# Patient Record
Sex: FEMALE | Race: WHITE | NOT HISPANIC OR LATINO | Employment: OTHER | URBAN - NONMETROPOLITAN AREA
[De-identification: names, ages, dates, MRNs, and addresses within clinical notes are randomized per-mention and may not be internally consistent; named-entity substitution may affect disease eponyms.]

---

## 2023-03-22 DIAGNOSIS — E78.5 DYSLIPIDEMIA: Primary | ICD-10-CM

## 2023-03-23 PROBLEM — M10.9 GOUT: Status: ACTIVE | Noted: 2023-03-23

## 2023-03-23 PROBLEM — E04.9 ENLARGED THYROID: Status: ACTIVE | Noted: 2023-03-23

## 2023-03-23 PROBLEM — M25.50 POLYARTHRALGIA: Status: ACTIVE | Noted: 2023-03-23

## 2023-03-23 PROBLEM — M25.512 LEFT SHOULDER PAIN: Status: ACTIVE | Noted: 2023-03-23

## 2023-03-23 PROBLEM — M54.2 CHRONIC NECK PAIN: Status: ACTIVE | Noted: 2023-03-23

## 2023-03-23 PROBLEM — G89.29 CHRONIC NECK PAIN: Status: ACTIVE | Noted: 2023-03-23

## 2023-03-23 PROBLEM — H92.09 EARACHE: Status: ACTIVE | Noted: 2023-03-23

## 2023-03-23 PROBLEM — I10 HTN (HYPERTENSION): Status: ACTIVE | Noted: 2023-03-23

## 2023-03-23 PROBLEM — H91.90 HEARING LOSS: Status: ACTIVE | Noted: 2023-03-23

## 2023-03-23 PROBLEM — R42 DIZZINESS: Status: ACTIVE | Noted: 2023-03-23

## 2023-03-23 PROBLEM — J30.9 ALLERGIC RHINITIS: Status: ACTIVE | Noted: 2023-03-23

## 2023-03-23 PROBLEM — E78.5 DYSLIPIDEMIA: Status: ACTIVE | Noted: 2023-03-23

## 2023-03-23 PROBLEM — E11.9 DMII (DIABETES MELLITUS, TYPE 2) (MULTI): Status: ACTIVE | Noted: 2023-03-23

## 2023-03-23 PROBLEM — H61.22 IMPACTED CERUMEN OF LEFT EAR: Status: ACTIVE | Noted: 2023-03-23

## 2023-03-23 PROBLEM — M79.642 PAIN OF LEFT HAND: Status: ACTIVE | Noted: 2023-03-23

## 2023-03-23 PROBLEM — D72.829 LEUKOCYTOSIS: Status: ACTIVE | Noted: 2023-03-23

## 2023-03-23 PROBLEM — M25.539 WRIST PAIN: Status: ACTIVE | Noted: 2023-03-23

## 2023-03-23 PROBLEM — M27.9 DISORDER OF JAW: Status: ACTIVE | Noted: 2023-03-23

## 2023-03-23 PROBLEM — M25.532 LEFT WRIST PAIN: Status: ACTIVE | Noted: 2023-03-23

## 2023-03-23 PROBLEM — K21.9 GERD (GASTROESOPHAGEAL REFLUX DISEASE): Status: ACTIVE | Noted: 2023-03-23

## 2023-03-23 PROBLEM — E03.9 ADULT HYPOTHYROIDISM: Status: ACTIVE | Noted: 2023-03-23

## 2023-03-23 PROBLEM — M25.531 RIGHT WRIST PAIN: Status: ACTIVE | Noted: 2023-03-23

## 2023-03-23 PROBLEM — H61.20 CERUMEN IMPACTION: Status: ACTIVE | Noted: 2023-03-23

## 2023-03-23 PROBLEM — R53.82 CHRONIC FATIGUE: Status: ACTIVE | Noted: 2023-03-23

## 2023-03-23 PROBLEM — G47.00 INSOMNIA: Status: ACTIVE | Noted: 2023-03-23

## 2023-03-23 PROBLEM — E55.9 VITAMIN D DEFICIENCY: Status: ACTIVE | Noted: 2023-03-23

## 2023-03-23 PROBLEM — F41.9 ANXIETY DISORDER: Status: ACTIVE | Noted: 2023-03-23

## 2023-03-23 RX ORDER — SIMVASTATIN 20 MG/1
1 TABLET, FILM COATED ORAL DAILY
COMMUNITY
Start: 2012-01-19 | End: 2023-06-09 | Stop reason: SDUPTHER

## 2023-03-23 RX ORDER — POLYETHYLENE GLYCOL 400 AND PROPYLENE GLYCOL 4; 3 MG/ML; MG/ML
SOLUTION/ DROPS OPHTHALMIC
COMMUNITY
End: 2023-06-28 | Stop reason: WASHOUT

## 2023-03-23 RX ORDER — SIMVASTATIN 20 MG/1
TABLET, FILM COATED ORAL
Qty: 90 TABLET | Refills: 0 | Status: SHIPPED | OUTPATIENT
Start: 2023-03-23 | End: 2023-06-28 | Stop reason: WASHOUT

## 2023-03-23 RX ORDER — LEVOTHYROXINE SODIUM 25 UG/1
1 TABLET ORAL DAILY
COMMUNITY
Start: 2021-10-11 | End: 2023-04-27 | Stop reason: ALTCHOICE

## 2023-03-23 RX ORDER — OMEPRAZOLE 20 MG/1
20 CAPSULE, DELAYED RELEASE ORAL
COMMUNITY
Start: 2022-09-05 | End: 2023-04-24

## 2023-03-23 RX ORDER — LABETALOL 200 MG/1
1 TABLET, FILM COATED ORAL DAILY
COMMUNITY
Start: 2012-01-19 | End: 2023-04-12

## 2023-03-23 RX ORDER — OMEPRAZOLE 20 MG/1
1 TABLET, DELAYED RELEASE ORAL DAILY
COMMUNITY
End: 2023-06-28 | Stop reason: SDUPTHER

## 2023-03-23 RX ORDER — ASPIRIN 81 MG/1
TABLET ORAL
COMMUNITY
End: 2023-06-28 | Stop reason: SDUPTHER

## 2023-03-23 RX ORDER — METFORMIN HYDROCHLORIDE 500 MG/1
1 TABLET ORAL 2 TIMES DAILY
COMMUNITY
Start: 2021-10-11 | End: 2023-04-24

## 2023-03-23 RX ORDER — CITALOPRAM 10 MG/1
1 TABLET ORAL DAILY
COMMUNITY
Start: 2023-02-16 | End: 2023-06-28 | Stop reason: WASHOUT

## 2023-03-23 RX ORDER — ALPRAZOLAM 0.25 MG/1
0.25 TABLET ORAL 2 TIMES DAILY PRN
COMMUNITY
End: 2023-03-24 | Stop reason: SDUPTHER

## 2023-03-23 RX ORDER — LATANOPROST 50 UG/ML
SOLUTION/ DROPS OPHTHALMIC
COMMUNITY
Start: 2021-05-24

## 2023-03-23 RX ORDER — ALBUTEROL SULFATE 0.83 MG/ML
SOLUTION RESPIRATORY (INHALATION)
COMMUNITY
Start: 2023-01-16 | End: 2023-09-26 | Stop reason: SDUPTHER

## 2023-03-23 RX ORDER — TRAMADOL HYDROCHLORIDE 50 MG/1
1 TABLET ORAL EVERY 8 HOURS PRN
COMMUNITY
Start: 2021-12-08 | End: 2023-06-28 | Stop reason: WASHOUT

## 2023-03-23 RX ORDER — ALLOPURINOL 300 MG/1
1 TABLET ORAL DAILY
COMMUNITY
Start: 2021-10-11 | End: 2023-06-28 | Stop reason: WASHOUT

## 2023-03-24 DIAGNOSIS — E78.5 DYSLIPIDEMIA: Primary | ICD-10-CM

## 2023-03-24 DIAGNOSIS — F41.9 ANXIETY: Primary | ICD-10-CM

## 2023-03-24 RX ORDER — ALPRAZOLAM 0.25 MG/1
0.25 TABLET ORAL 2 TIMES DAILY PRN
Qty: 60 TABLET | Refills: 0 | Status: SHIPPED | OUTPATIENT
Start: 2023-03-24 | End: 2023-04-27 | Stop reason: SDUPTHER

## 2023-03-24 RX ORDER — SIMVASTATIN 20 MG/1
TABLET, FILM COATED ORAL
Qty: 90 TABLET | Refills: 0 | Status: SHIPPED | OUTPATIENT
Start: 2023-03-24 | End: 2023-06-28 | Stop reason: WASHOUT

## 2023-03-28 ENCOUNTER — APPOINTMENT (OUTPATIENT)
Dept: PRIMARY CARE | Facility: CLINIC | Age: 83
End: 2023-03-28
Payer: MEDICARE

## 2023-03-30 ENCOUNTER — OFFICE VISIT (OUTPATIENT)
Dept: PRIMARY CARE | Facility: CLINIC | Age: 83
End: 2023-03-30
Payer: MEDICARE

## 2023-03-30 VITALS
WEIGHT: 161 LBS | HEART RATE: 64 BPM | DIASTOLIC BLOOD PRESSURE: 62 MMHG | HEIGHT: 62 IN | OXYGEN SATURATION: 98 % | SYSTOLIC BLOOD PRESSURE: 168 MMHG | BODY MASS INDEX: 29.63 KG/M2

## 2023-03-30 DIAGNOSIS — E78.5 DYSLIPIDEMIA: ICD-10-CM

## 2023-03-30 DIAGNOSIS — Z78.0 POSTMENOPAUSAL: ICD-10-CM

## 2023-03-30 DIAGNOSIS — I10 PRIMARY HYPERTENSION: Primary | ICD-10-CM

## 2023-03-30 DIAGNOSIS — E55.9 VITAMIN D DEFICIENCY: ICD-10-CM

## 2023-03-30 DIAGNOSIS — M1A.09X0 IDIOPATHIC CHRONIC GOUT OF MULTIPLE SITES WITHOUT TOPHUS: ICD-10-CM

## 2023-03-30 DIAGNOSIS — F41.1 GENERALIZED ANXIETY DISORDER: ICD-10-CM

## 2023-03-30 DIAGNOSIS — M25.50 POLYARTHRALGIA: ICD-10-CM

## 2023-03-30 DIAGNOSIS — E03.9 ADULT HYPOTHYROIDISM: ICD-10-CM

## 2023-03-30 DIAGNOSIS — R53.82 CHRONIC FATIGUE: ICD-10-CM

## 2023-03-30 DIAGNOSIS — E66.3 OVER WEIGHT: ICD-10-CM

## 2023-03-30 DIAGNOSIS — Z12.31 ENCOUNTER FOR SCREENING MAMMOGRAM FOR MALIGNANT NEOPLASM OF BREAST: ICD-10-CM

## 2023-03-30 DIAGNOSIS — E11.9 TYPE 2 DIABETES MELLITUS WITHOUT COMPLICATION, WITHOUT LONG-TERM CURRENT USE OF INSULIN (MULTI): ICD-10-CM

## 2023-03-30 PROCEDURE — 3078F DIAST BP <80 MM HG: CPT | Performed by: FAMILY MEDICINE

## 2023-03-30 PROCEDURE — 1159F MED LIST DOCD IN RCRD: CPT | Performed by: FAMILY MEDICINE

## 2023-03-30 PROCEDURE — 1036F TOBACCO NON-USER: CPT | Performed by: FAMILY MEDICINE

## 2023-03-30 PROCEDURE — 1160F RVW MEDS BY RX/DR IN RCRD: CPT | Performed by: FAMILY MEDICINE

## 2023-03-30 PROCEDURE — 99214 OFFICE O/P EST MOD 30 MIN: CPT | Performed by: FAMILY MEDICINE

## 2023-03-30 PROCEDURE — 3077F SYST BP >= 140 MM HG: CPT | Performed by: FAMILY MEDICINE

## 2023-03-30 ASSESSMENT — PAIN SCALES - GENERAL: PAINLEVEL: 0-NO PAIN

## 2023-03-30 NOTE — PROGRESS NOTES
"Subjective     Linsey Donis is a 82 y.o. female who presents for Follow-up (Follow up meds).      HPI  The patient is a 82 year-old female presenting to the clinic for follow up on ankle and foot pain.  Follow-up.  Recently her blood pressure was running high.  I have adjusted her medication doses and has been watching diet has been exercising has been taking medication.  Blood pressure still running high.  I have reviewed AVA-7 with the patient.  History of anxiety.  Denies depression.  Denies suicidal.  Denies homicidal.  Educated on gout care and prevention treatment and management.  Educated on hypothyroidism.  Educated on joint pains and muscle aches.  Complaining feeling tired.  Advised on diet exercise.  Educated on dyslipidemia and diet and exercise.  Educated on vitamin D deficiency.  Educated on type 2 diabetes and diet exercise.    Educated on postmenopausal care and symptoms and management.  Educated on overweight and diet exercise.  Will lose weight.            Review of Systems  Review of systems    General.  Denies fever.  Denies chills.    HEENT denies nasal congestion.  Denies sinus pressure.    Respiratory.  Denies cough.  Denies shortness of breath.    Cardiovascular.  Dictated as above.      Gastrointestinal.  Denies nausea vomiting diarrhea.  Denies abdominal pain.    Genitourinary denies burning urination.  Denies frequent urination.  Denies flank pain.  Denies blood in the urine.  Denies abnormal vaginal discharge.    Neurology.  Denies tingling numbness but denies weakness.  Denies headache.  Denies blurred vision.    Musculoskeletal.  Dictated as above.      Endocrinology. Dictated as above.      Psychiatric.  Denies depression.  Denies anxiety.  Denies suicidal.  Denies homicidal.        Objective   /62 (BP Location: Left arm, Patient Position: Sitting, BP Cuff Size: Large adult)   Pulse 64   Ht 1.575 m (5' 2\")   Wt 73 kg (161 lb)   SpO2 98%   BMI 29.45 kg/m²   V   Physical " Exam  General.  Not in distress.  HEENT normocephalic anicteric sclerae.  Neck soft supple no thyromegaly.  No carotid bruit.  Lungs are clear.  Heart regular.  Abdomen soft nontender nondistended bowel sounds are positive.  Extremities no clubbing cyanosis or edema.  Psychiatric.  Has good eye contact.  No crying spells noted.  Speech was normal.  Denies depression.  Denies suicidal.  Denies homicidal.    This was completed via telephone due to the restrictions of the COVID-19 pandemic.  All issues as below were discussed and addressed but no physical exam was performed.  If it was felt that the patient should be evaluated in clinic then they were director there.  The patient/parent verbally consented to the visit.   Assessment/Plan     1.  Hypertension.  Educated on low-salt diet exercise.  Discussed about medication doses.  Explained adverse effects of medication.  We will continue monitor.    2.  Anxiety.  Counseled for anxiety and stress.  Denies depression.  Denies suicidal.  Denies homicidal.    3.  Gout.  Dictated as above.    4.  Hypothyroidism.  Dictated as above  5.  Polyarthralgia.  Dictated as above.    6.  Fatigue.  Dictated as above  7.  Dyslipidemia.  Educated on diet exercise we will continue monitor    8.  Vitamin D deficiency.  Educated on vitamin D deficiency we will continue monitor.    9.  Type 2 diabetes.  Educated on diet exercise.  Advised to check blood sugars at directed.  Recommend eye exam once a year.  Educated on diabetic foot care.    10.  Postmenopausal.  Dictated as above    11.  Overweight.  Dictated as above.                    Problem List Items Addressed This Visit          Circulatory    HTN (hypertension)       Musculoskeletal    Polyarthralgia       Endocrine/Metabolic    DMII (diabetes mellitus, type 2) (CMS/[facility])    Relevant Orders    Hemoglobin A1C    Albumin , Urine Random    Adult hypothyroidism    Vitamin D deficiency    Relevant Orders    Vitamin D 25-Hydroxy,Total        Other    Anxiety disorder    Chronic fatigue    Relevant Orders    CBC and Auto Differential    Urinalysis with Reflex Microscopic    Tsh With Reflex To Free T4 If Abnormal    Vitamin B12    Folate    Dyslipidemia    Relevant Orders    Comprehensive metabolic panel    Lipid panel    Gout     Other Visit Diagnoses       Over weight    -  Primary    BMI 29.0-29.9,adult        Postmenopausal        Relevant Orders    XR DEXA bone density    Encounter for screening mammogram for malignant neoplasm of breast        Relevant Orders    BI mammo bilateral screening tomosynthesis            Scribe Attestation  By signing my name below, IBetzy Scribe   attest that this documentation has been prepared under the direction and in the presence of Dagoberto Irwin MD.

## 2023-04-11 DIAGNOSIS — I10 PRIMARY HYPERTENSION: Primary | ICD-10-CM

## 2023-04-12 RX ORDER — LABETALOL 200 MG/1
TABLET, FILM COATED ORAL
Qty: 90 TABLET | Refills: 0 | Status: SHIPPED | OUTPATIENT
Start: 2023-04-12 | End: 2023-05-09 | Stop reason: SDUPTHER

## 2023-04-18 ENCOUNTER — LAB (OUTPATIENT)
Dept: LAB | Facility: LAB | Age: 83
End: 2023-04-18
Payer: MEDICARE

## 2023-04-18 DIAGNOSIS — E78.5 DYSLIPIDEMIA: ICD-10-CM

## 2023-04-18 DIAGNOSIS — R53.82 CHRONIC FATIGUE: ICD-10-CM

## 2023-04-18 DIAGNOSIS — E55.9 VITAMIN D DEFICIENCY: ICD-10-CM

## 2023-04-18 LAB
ALANINE AMINOTRANSFERASE (SGPT) (U/L) IN SER/PLAS: 10 U/L (ref 7–45)
ALBUMIN (G/DL) IN SER/PLAS: 4.4 G/DL (ref 3.4–5)
ALKALINE PHOSPHATASE (U/L) IN SER/PLAS: 47 U/L (ref 33–136)
ANION GAP IN SER/PLAS: 14 MMOL/L (ref 10–20)
APPEARANCE, URINE: CLEAR
ASPARTATE AMINOTRANSFERASE (SGOT) (U/L) IN SER/PLAS: 13 U/L (ref 9–39)
BASOPHILS (10*3/UL) IN BLOOD BY AUTOMATED COUNT: 0.07 X10E9/L (ref 0–0.1)
BASOPHILS/100 LEUKOCYTES IN BLOOD BY AUTOMATED COUNT: 0.8 % (ref 0–2)
BILIRUBIN TOTAL (MG/DL) IN SER/PLAS: 0.5 MG/DL (ref 0–1.2)
BILIRUBIN, URINE: NEGATIVE
BLOOD, URINE: NEGATIVE
CALCIUM (MG/DL) IN SER/PLAS: 9.6 MG/DL (ref 8.6–10.3)
CARBON DIOXIDE, TOTAL (MMOL/L) IN SER/PLAS: 29 MMOL/L (ref 21–32)
CHLORIDE (MMOL/L) IN SER/PLAS: 103 MMOL/L (ref 98–107)
CHOLESTEROL (MG/DL) IN SER/PLAS: 160 MG/DL (ref 0–199)
CHOLESTEROL IN HDL (MG/DL) IN SER/PLAS: 57.9 MG/DL
CHOLESTEROL/HDL RATIO: 2.8
COLOR, URINE: YELLOW
CREATININE (MG/DL) IN SER/PLAS: 0.59 MG/DL (ref 0.5–1.05)
EOSINOPHILS (10*3/UL) IN BLOOD BY AUTOMATED COUNT: 0.22 X10E9/L (ref 0–0.4)
EOSINOPHILS/100 LEUKOCYTES IN BLOOD BY AUTOMATED COUNT: 2.6 % (ref 0–6)
ERYTHROCYTE DISTRIBUTION WIDTH (RATIO) BY AUTOMATED COUNT: 13.6 % (ref 11.5–14.5)
ERYTHROCYTE MEAN CORPUSCULAR HEMOGLOBIN CONCENTRATION (G/DL) BY AUTOMATED: 32.1 G/DL (ref 32–36)
ERYTHROCYTE MEAN CORPUSCULAR VOLUME (FL) BY AUTOMATED COUNT: 95 FL (ref 80–100)
ERYTHROCYTES (10*6/UL) IN BLOOD BY AUTOMATED COUNT: 4.4 X10E12/L (ref 4–5.2)
GFR FEMALE: >90 ML/MIN/1.73M2
GLUCOSE (MG/DL) IN SER/PLAS: 103 MG/DL (ref 74–99)
GLUCOSE, URINE: NEGATIVE MG/DL
HEMATOCRIT (%) IN BLOOD BY AUTOMATED COUNT: 41.7 % (ref 36–46)
HEMOGLOBIN (G/DL) IN BLOOD: 13.4 G/DL (ref 12–16)
IMMATURE GRANULOCYTES/100 LEUKOCYTES IN BLOOD BY AUTOMATED COUNT: 0.2 % (ref 0–0.9)
KETONES, URINE: NEGATIVE MG/DL
LDL: 79 MG/DL (ref 0–99)
LEUKOCYTE ESTERASE, URINE: ABNORMAL
LEUKOCYTES (10*3/UL) IN BLOOD BY AUTOMATED COUNT: 8.3 X10E9/L (ref 4.4–11.3)
LYMPHOCYTES (10*3/UL) IN BLOOD BY AUTOMATED COUNT: 2.2 X10E9/L (ref 0.8–3)
LYMPHOCYTES/100 LEUKOCYTES IN BLOOD BY AUTOMATED COUNT: 26.4 % (ref 13–44)
MONOCYTES (10*3/UL) IN BLOOD BY AUTOMATED COUNT: 0.84 X10E9/L (ref 0.05–0.8)
MONOCYTES/100 LEUKOCYTES IN BLOOD BY AUTOMATED COUNT: 10.1 % (ref 2–10)
MUCUS, URINE: ABNORMAL /LPF
NEUTROPHILS (10*3/UL) IN BLOOD BY AUTOMATED COUNT: 4.99 X10E9/L (ref 1.6–5.5)
NEUTROPHILS/100 LEUKOCYTES IN BLOOD BY AUTOMATED COUNT: 59.9 % (ref 40–80)
NITRITE, URINE: NEGATIVE
PH, URINE: 6 (ref 5–8)
PLATELETS (10*3/UL) IN BLOOD AUTOMATED COUNT: 272 X10E9/L (ref 150–450)
POTASSIUM (MMOL/L) IN SER/PLAS: 3.7 MMOL/L (ref 3.5–5.3)
PROTEIN TOTAL: 7.2 G/DL (ref 6.4–8.2)
PROTEIN, URINE: ABNORMAL MG/DL
RBC, URINE: 1 /HPF (ref 0–5)
SODIUM (MMOL/L) IN SER/PLAS: 142 MMOL/L (ref 136–145)
SPECIFIC GRAVITY, URINE: 1.02 (ref 1–1.03)
SQUAMOUS EPITHELIAL CELLS, URINE: 1 /HPF
THYROTROPIN (MIU/L) IN SER/PLAS BY DETECTION LIMIT <= 0.05 MIU/L: 4.81 MIU/L (ref 0.44–3.98)
THYROXINE (T4) FREE (NG/DL) IN SER/PLAS: 0.66 NG/DL (ref 0.61–1.12)
TRIGLYCERIDE (MG/DL) IN SER/PLAS: 114 MG/DL (ref 0–149)
UREA NITROGEN (MG/DL) IN SER/PLAS: 12 MG/DL (ref 6–23)
UROBILINOGEN, URINE: <2 MG/DL (ref 0–1.9)
VLDL: 23 MG/DL (ref 0–40)
WBC, URINE: 13 /HPF (ref 0–5)

## 2023-04-18 PROCEDURE — 36415 COLL VENOUS BLD VENIPUNCTURE: CPT

## 2023-04-18 PROCEDURE — 82306 VITAMIN D 25 HYDROXY: CPT

## 2023-04-18 PROCEDURE — 84443 ASSAY THYROID STIM HORMONE: CPT

## 2023-04-18 PROCEDURE — 80053 COMPREHEN METABOLIC PANEL: CPT

## 2023-04-18 PROCEDURE — 82607 VITAMIN B-12: CPT

## 2023-04-18 PROCEDURE — 82746 ASSAY OF FOLIC ACID SERUM: CPT

## 2023-04-18 PROCEDURE — 82570 ASSAY OF URINE CREATININE: CPT

## 2023-04-18 PROCEDURE — 85025 COMPLETE CBC W/AUTO DIFF WBC: CPT

## 2023-04-18 PROCEDURE — 83036 HEMOGLOBIN GLYCOSYLATED A1C: CPT

## 2023-04-18 PROCEDURE — 82043 UR ALBUMIN QUANTITATIVE: CPT

## 2023-04-18 PROCEDURE — 81001 URINALYSIS AUTO W/SCOPE: CPT

## 2023-04-18 PROCEDURE — 80061 LIPID PANEL: CPT

## 2023-04-18 PROCEDURE — 84439 ASSAY OF FREE THYROXINE: CPT

## 2023-04-19 LAB
ALBUMIN (MG/L) IN URINE: 80.1 MG/L
ALBUMIN/CREATININE (UG/MG) IN URINE: 68.5 UG/MG CRT (ref 0–30)
CALCIDIOL (25 OH VITAMIN D3) (NG/ML) IN SER/PLAS: 44 NG/ML
COBALAMIN (VITAMIN B12) (PG/ML) IN SER/PLAS: 619 PG/ML (ref 211–911)
CREATININE (MG/DL) IN URINE: 117 MG/DL (ref 20–320)
ESTIMATED AVERAGE GLUCOSE FOR HBA1C: 143 MG/DL
FOLATE (NG/ML) IN SER/PLAS: 11 NG/ML
HEMOGLOBIN A1C/HEMOGLOBIN TOTAL IN BLOOD: 6.6 %

## 2023-04-26 NOTE — PROGRESS NOTES
Subjective     Linsey Donis is a 82 y.o. female who presents for Follow-up (Follow up meds/results).      HPI  The patient is a 82 year-old female presenting to the clinic for follow up on lab results. I have reviewed results from her most recent blood work with her.  Complete blood work ordered. I have educated the patient on fatigue. The patient denies restless leg syndrome and denies obstructive sleep apnea.      Component      Latest Ref Rng 4/18/2023   WBC      4.4 - 11.3 x10E9/L 8.3    RBC      4.00 - 5.20 x10E12/L 4.40    HEMOGLOBIN      12.0 - 16.0 g/dL 13.4    HEMATOCRIT      36.0 - 46.0 % 41.7    MCV      80 - 100 fL 95    MCHC      32.0 - 36.0 g/dL 32.1    Platelets      150 - 450 x10E9/L 272    RED CELL DISTRIBUTION WIDTH      11.5 - 14.5 % 13.6    Neutrophils %      40.0 - 80.0 % 59.9    Immature Granulocytes %, Automated      0.0 - 0.9 % 0.2    Lymphocytes %      13.0 - 44.0 % 26.4    Monocytes %      2.0 - 10.0 % 10.1    Eosinophils %      0.0 - 6.0 % 2.6    Basophils %      0.0 - 2.0 % 0.8    Neutrophils Absolute      1.60 - 5.50 x10E9/L 4.99    Lymphocytes Absolute      0.80 - 3.00 x10E9/L 2.20    Monocytes Absolute      0.05 - 0.80 x10E9/L 0.84 (H)    Eosinophils Absolute      0.00 - 0.40 x10E9/L 0.22    Basophils Absolute      0.00 - 0.10 x10E9/L 0.07    GLUCOSE      74 - 99 mg/dL 103 (H)    SODIUM      136 - 145 mmol/L 142    POTASSIUM      3.5 - 5.3 mmol/L 3.7    CHLORIDE      98 - 107 mmol/L 103    Bicarbonate      21 - 32 mmol/L 29    Anion Gap      10 - 20 mmol/L 14    Blood Urea Nitrogen      6 - 23 mg/dL 12    Creatinine      0.50 - 1.05 mg/dL 0.59    GFR Female      >90 mL/min/1.73m2 >90    Calcium      8.6 - 10.3 mg/dL 9.6    Albumin      3.4 - 5.0 g/dL 4.4    Alkaline Phosphatase      33 - 136 U/L 47    Total Protein      6.4 - 8.2 g/dL 7.2    AST      9 - 39 U/L 13    Bilirubin Total      0.0 - 1.2 mg/dL 0.5    ALT      7 - 45 U/L 10    Color, Urine      STRAW,YELLOW  YELLOW     Appearance, Urine      CLEAR  CLEAR    Specific Gravity, Urine      1.005 - 1.035  1.019    pH, Urine      5.0 - 8.0  6.0    Protein, Urine      NEGATIVE mg/dL 30(1+) !    Glucose, Urine      NEGATIVE mg/dL NEGATIVE    Blood, Urine      NEGATIVE  NEGATIVE    Ketones, Urine      NEGATIVE mg/dL NEGATIVE    Bilirubin, Urine      NEGATIVE  NEGATIVE    Urobilinogen, Urine      0.0 - 1.9 mg/dL <2.0    Nitrite, Urine      NEGATIVE  NEGATIVE    Leukocyte Esterase, Urine      NEGATIVE  TRACE !    CHOLESTEROL      0 - 199 mg/dL 160    HDL CHOLESTEROL      mg/dL 57.9    Cholesterol/HDL Ratio 2.8    LDL      0 - 99 mg/dL 79    VLDL      0 - 40 mg/dL 23    TRIGLYCERIDES      0 - 149 mg/dL 114    WBC, Urine      0 - 5 /HPF 13 !    RBC, Urine      0 - 5 /HPF 1 !    Squamous Epithelial Cells, Urine      /HPF 1    Mucus, Urine      /LPF FEW    ALBUMIN (MG/L) IN URINE      Not Established mg/L 80.1    Albumin/Creatine Ratio      0.0 - 30.0 ug/mg crt 68.5 (H)    Creatinine, Urine Random      20.0 - 320.0 mg/dL 117.0    Hemoglobin A1C      % 6.6 !    Estimated Average Glucose      MG/    Thyroid Stimulating Hormone      0.44 - 3.98 mIU/L 4.81 (H)    Vitamin B12      211 - 911 pg/mL 619    FOLATE      >5.0 ng/mL 11.0    Vitamin D, 25-Hydroxy, Total      ng/mL 44    Thyroxine, Free      0.61 - 1.12 ng/dL 0.66       Legend:  (H) High  ! Abnormal  Follow-up.  Reviewed lab results.  Educated on dyslipidemia and diet and exercise.  Reviewed lab results.  Educated on overweight and diet and exercise.  Advised to lose weight.  Educated on hypertension and low-salt and exercise.  Educated on vitamin D deficiency.  Educated on type 2 diabetes and diet exercise.  Educated on hypothyroidism.  Complaining feeling tired.  Advised on diet and exercise.  Reviewed AVA-7 with the patient.  Complaining anxiety and stress.  Feeling anxious and stressed and overwhelmed.  Feeling worried all the time.  Denies depression.  Denies suicidal per denies  "homicidal.  Had been to specialist for neurological disease.  Had been to specialist for Sjogren's syndrome.  Has history of osteomyelitis and she was treated for osteomyelitis in the past and history of chronic bronchitis.  Denies shortness of breath.  Continue current management.  Patient stated that she was treated for DVT.  Denies calf pain or swelling or redness or tenderness.    Educated on osteopenia and muscle strength and exercise.          Review of Systems  Review of systems    General.  Denies fever.  Denies chills.    HEENT denies nasal congestion.  Denies sinus pressure.    Respiratory.  Denies cough.  Denies shortness of breath.    Cardiovascular.  Denies chest pain.  Denies heart palpitations.  Denies shortness of breath.    Gastrointestinal.  Denies nausea vomiting diarrhea.  Denies abdominal pain.    Genitourinary denies burning urination.  Denies frequent urination.  Denies flank pain.  Denies blood in the urine.  Denies abnormal vaginal discharge.    Neurology.  Denies tingling numbness but denies weakness.  Denies headache.  Denies blurred vision.    Musculoskeletal.  Denies body aches.  Denies joint pains.  Denies muscle aches.  Denies muscle weakness    Endocrinology.  Denies cold intolerance.  Denies hot intolerance.    Psychiatric.  Dictated as above  Objective   /77 (BP Location: Left arm, Patient Position: Sitting, BP Cuff Size: Large adult)   Pulse 96   Ht 1.575 m (5' 2\")   Wt 71.7 kg (158 lb)   SpO2 98%   BMI 28.90 kg/m²        Physical Exam  General.  Not in distress.  HEENT normocephalic anicteric sclerae.  Neck soft supple no thyromegaly.  No carotid bruit.  Lungs are clear.  Heart regular.  Abdomen soft nontender nondistended bowel sounds are positive.  Extremities no clubbing cyanosis or edema.  Psychiatric.  Appears to be anxious.  Denies depression.  Denies suicidal.  Denies homicidal.  Foot exam.  Bilateral foot exam.  No rashes noted.  No lesions noted.  No ulcers " noted.  No onychomycosis noted.  Sensation was intact with a monofilament.  Bilateral posterior tibial and dorsalis pedis arteries are palpable        Assessment/Plan     1.  Dyslipidemia.  Educated on diet exercise.  Reviewed lab results.  We will continue monitor.    2.  Overweight.  Dictated as above.    3.  Hypertension.  Educated on low-salt diet exercise.  Continue current management    4.  Vitamin D deficiency.  Educated on vitamin D deficiency.  We will continue monitor    5.  Type 2 diabetes.  Educated on diet exercise.  Advised to check blood sugars at least once daily.  Continue current management       Recommend eye exam once a year.  Educated on diabetic foot care.    6.  Hypothyroidism.  Educated on hypothyroidism.  Continue current management.    7.  Anxiety.  Reviewed OARRS report and validated.  Caution advised about medication including abuse, addiction and dependency and sedating effect.    8.  Chronic bronchitis.  Denies shortness of breath.  Continue current management  Osteopenia.  Educated on muscle center exercise.  Recommended calcium and vitamin D.                                     Problem List Items Addressed This Visit          Respiratory    Simple chronic bronchitis (CMS/HCC)       Circulatory    HTN (hypertension)    Deep vein thrombosis (DVT) of other vein of lower extremity, unspecified chronicity, unspecified laterality (CMS/[facility])       Musculoskeletal    Osteomyelitis, unspecified site, unspecified type (CMS/[facility])       Endocrine/Metabolic    DMII (diabetes mellitus, type 2) (CMS/[facility])    Adult hypothyroidism    Relevant Medications    Synthroid 50 mcg tablet    Other Relevant Orders    Tsh With Reflex To Free T4 If Abnormal    Vitamin D deficiency       Immune    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[facility])       Other    Anxiety disorder    Chronic fatigue    Relevant Medications    folic acid (Folvite) 1 mg tablet    Dyslipidemia - Primary    Sjogren's syndrome with keratoconjunctivitis sicca  (CMS/[facility])     Other Visit Diagnoses       BMI 28.0-28.9,adult        Osteopenia of multiple sites        Relevant Medications    calcium citrate-vitamin D3 (Citracal + D Maximum) 315 mg-6.25 mcg (250 unit) tablet        Scribe Attestation  By signing my name below, IBetzy Scribe   attest that this documentation has been prepared under the direction and in the presence of Dagoberto Irwin MD.

## 2023-04-27 ENCOUNTER — OFFICE VISIT (OUTPATIENT)
Dept: PRIMARY CARE | Facility: CLINIC | Age: 83
End: 2023-04-27
Payer: MEDICARE

## 2023-04-27 VITALS
HEIGHT: 62 IN | WEIGHT: 158 LBS | BODY MASS INDEX: 29.08 KG/M2 | OXYGEN SATURATION: 98 % | SYSTOLIC BLOOD PRESSURE: 154 MMHG | HEART RATE: 96 BPM | DIASTOLIC BLOOD PRESSURE: 77 MMHG

## 2023-04-27 DIAGNOSIS — J41.0 SIMPLE CHRONIC BRONCHITIS (MULTI): ICD-10-CM

## 2023-04-27 DIAGNOSIS — I82.499 DEEP VEIN THROMBOSIS (DVT) OF OTHER VEIN OF LOWER EXTREMITY, UNSPECIFIED CHRONICITY, UNSPECIFIED LATERALITY (MULTI): ICD-10-CM

## 2023-04-27 DIAGNOSIS — I10 PRIMARY HYPERTENSION: ICD-10-CM

## 2023-04-27 DIAGNOSIS — E11.9 TYPE 2 DIABETES MELLITUS WITHOUT COMPLICATION, WITHOUT LONG-TERM CURRENT USE OF INSULIN (MULTI): ICD-10-CM

## 2023-04-27 DIAGNOSIS — F41.9 ANXIETY: ICD-10-CM

## 2023-04-27 DIAGNOSIS — R53.82 CHRONIC FATIGUE: ICD-10-CM

## 2023-04-27 DIAGNOSIS — E03.9 ADULT HYPOTHYROIDISM: ICD-10-CM

## 2023-04-27 DIAGNOSIS — D89.9: ICD-10-CM

## 2023-04-27 DIAGNOSIS — M86.9 OSTEOMYELITIS, UNSPECIFIED SITE, UNSPECIFIED TYPE (MULTI): ICD-10-CM

## 2023-04-27 DIAGNOSIS — M35.01 SJOGREN'S SYNDROME WITH KERATOCONJUNCTIVITIS SICCA (MULTI): ICD-10-CM

## 2023-04-27 DIAGNOSIS — E78.5 DYSLIPIDEMIA: Primary | ICD-10-CM

## 2023-04-27 DIAGNOSIS — F41.1 GENERALIZED ANXIETY DISORDER: ICD-10-CM

## 2023-04-27 DIAGNOSIS — M85.89 OSTEOPENIA OF MULTIPLE SITES: ICD-10-CM

## 2023-04-27 DIAGNOSIS — E55.9 VITAMIN D DEFICIENCY: ICD-10-CM

## 2023-04-27 PROCEDURE — 1036F TOBACCO NON-USER: CPT | Performed by: FAMILY MEDICINE

## 2023-04-27 PROCEDURE — 1160F RVW MEDS BY RX/DR IN RCRD: CPT | Performed by: FAMILY MEDICINE

## 2023-04-27 PROCEDURE — 99214 OFFICE O/P EST MOD 30 MIN: CPT | Performed by: FAMILY MEDICINE

## 2023-04-27 PROCEDURE — 1159F MED LIST DOCD IN RCRD: CPT | Performed by: FAMILY MEDICINE

## 2023-04-27 PROCEDURE — 3077F SYST BP >= 140 MM HG: CPT | Performed by: FAMILY MEDICINE

## 2023-04-27 PROCEDURE — 3078F DIAST BP <80 MM HG: CPT | Performed by: FAMILY MEDICINE

## 2023-04-27 RX ORDER — LEVOTHYROXINE SODIUM 50 UG/1
50 TABLET ORAL DAILY
Qty: 30 TABLET | Refills: 1 | Status: SHIPPED | OUTPATIENT
Start: 2023-04-27 | End: 2023-06-09 | Stop reason: SDUPTHER

## 2023-04-27 RX ORDER — CALCIUM CITRATE/VITAMIN D3 315MG-6.25
1 TABLET ORAL 2 TIMES DAILY
Qty: 60 TABLET | Refills: 0 | Status: SHIPPED | OUTPATIENT
Start: 2023-04-27 | End: 2023-06-09 | Stop reason: SDUPTHER

## 2023-04-27 RX ORDER — ALPRAZOLAM 0.25 MG/1
0.25 TABLET ORAL 2 TIMES DAILY PRN
Qty: 60 TABLET | Refills: 0 | Status: SHIPPED | OUTPATIENT
Start: 2023-04-27 | End: 2023-05-25 | Stop reason: SDUPTHER

## 2023-04-27 RX ORDER — FOLIC ACID 1 MG/1
1 TABLET ORAL DAILY
Qty: 30 TABLET | Refills: 1 | Status: SHIPPED | OUTPATIENT
Start: 2023-04-27 | End: 2023-06-09 | Stop reason: SDUPTHER

## 2023-04-27 ASSESSMENT — ENCOUNTER SYMPTOMS
OCCASIONAL FEELINGS OF UNSTEADINESS: 0
LOSS OF SENSATION IN FEET: 0
DEPRESSION: 0

## 2023-04-27 ASSESSMENT — PAIN SCALES - GENERAL: PAINLEVEL: 0-NO PAIN

## 2023-05-09 DIAGNOSIS — I10 PRIMARY HYPERTENSION: ICD-10-CM

## 2023-05-09 RX ORDER — LABETALOL 200 MG/1
1 TABLET, FILM COATED ORAL DAILY
Qty: 30 TABLET | Refills: 0 | Status: SHIPPED | OUTPATIENT
Start: 2023-05-09 | End: 2023-05-25 | Stop reason: SDUPTHER

## 2023-05-25 DIAGNOSIS — F41.9 ANXIETY: ICD-10-CM

## 2023-05-25 DIAGNOSIS — I10 PRIMARY HYPERTENSION: ICD-10-CM

## 2023-05-25 RX ORDER — ALPRAZOLAM 0.25 MG/1
0.25 TABLET ORAL 2 TIMES DAILY PRN
Qty: 60 TABLET | Refills: 0 | Status: SHIPPED | OUTPATIENT
Start: 2023-05-26 | End: 2023-06-28 | Stop reason: SDUPTHER

## 2023-05-25 RX ORDER — LABETALOL 200 MG/1
1 TABLET, FILM COATED ORAL 2 TIMES DAILY
Qty: 180 TABLET | Refills: 0 | Status: SHIPPED | OUTPATIENT
Start: 2023-05-25 | End: 2023-06-09 | Stop reason: SDUPTHER

## 2023-06-09 DIAGNOSIS — E78.5 DYSLIPIDEMIA: Primary | ICD-10-CM

## 2023-06-09 DIAGNOSIS — M85.89 OSTEOPENIA OF MULTIPLE SITES: ICD-10-CM

## 2023-06-09 DIAGNOSIS — R53.82 CHRONIC FATIGUE: ICD-10-CM

## 2023-06-09 DIAGNOSIS — I10 PRIMARY HYPERTENSION: ICD-10-CM

## 2023-06-09 DIAGNOSIS — E03.9 ADULT HYPOTHYROIDISM: ICD-10-CM

## 2023-06-09 RX ORDER — LABETALOL 200 MG/1
1 TABLET, FILM COATED ORAL 2 TIMES DAILY
Qty: 180 TABLET | Refills: 0 | Status: SHIPPED | OUTPATIENT
Start: 2023-06-09 | End: 2023-06-28 | Stop reason: SDUPTHER

## 2023-06-09 RX ORDER — LEVOTHYROXINE SODIUM 50 UG/1
50 TABLET ORAL DAILY
Qty: 30 TABLET | Refills: 1 | Status: SHIPPED | OUTPATIENT
Start: 2023-06-09 | End: 2023-06-28 | Stop reason: SDUPTHER

## 2023-06-09 RX ORDER — SIMVASTATIN 20 MG/1
20 TABLET, FILM COATED ORAL DAILY
Qty: 30 TABLET | Refills: 0 | Status: SHIPPED | OUTPATIENT
Start: 2023-06-09 | End: 2023-06-28 | Stop reason: SDUPTHER

## 2023-06-09 RX ORDER — CALCIUM CITRATE/VITAMIN D3 315MG-6.25
1 TABLET ORAL 2 TIMES DAILY
Qty: 60 TABLET | Refills: 0 | Status: SHIPPED | OUTPATIENT
Start: 2023-06-09 | End: 2023-06-28 | Stop reason: SDUPTHER

## 2023-06-09 RX ORDER — FOLIC ACID 1 MG/1
1 TABLET ORAL DAILY
Qty: 30 TABLET | Refills: 1 | Status: SHIPPED | OUTPATIENT
Start: 2023-06-09 | End: 2023-06-28 | Stop reason: SDUPTHER

## 2023-06-26 PROBLEM — M79.642 PAIN OF LEFT HAND: Status: RESOLVED | Noted: 2023-03-23 | Resolved: 2023-06-26

## 2023-06-26 PROBLEM — M25.531 RIGHT WRIST PAIN: Status: RESOLVED | Noted: 2023-03-23 | Resolved: 2023-06-26

## 2023-06-26 PROBLEM — Z86.010 HISTORY OF ADENOMATOUS POLYP OF COLON: Status: RESOLVED | Noted: 2019-05-21 | Resolved: 2023-06-26

## 2023-06-26 PROBLEM — H61.22 IMPACTED CERUMEN OF LEFT EAR: Status: RESOLVED | Noted: 2023-03-23 | Resolved: 2023-06-26

## 2023-06-26 PROBLEM — Z86.73 HISTORY OF STROKE: Status: ACTIVE | Noted: 2017-07-17

## 2023-06-26 PROBLEM — M54.12 CERVICAL RADICULOPATHY: Status: ACTIVE | Noted: 2017-05-09

## 2023-06-26 PROBLEM — G89.4 CHRONIC PAIN SYNDROME: Status: ACTIVE | Noted: 2023-06-26

## 2023-06-26 PROBLEM — M54.41 ACUTE BILATERAL LOW BACK PAIN WITH RIGHT-SIDED SCIATICA: Status: RESOLVED | Noted: 2018-06-15 | Resolved: 2023-06-26

## 2023-06-26 PROBLEM — M25.512 LEFT SHOULDER PAIN: Status: RESOLVED | Noted: 2023-03-23 | Resolved: 2023-06-26

## 2023-06-26 PROBLEM — Z98.890 HISTORY OF LUMBAR SURGERY: Status: RESOLVED | Noted: 2018-01-22 | Resolved: 2023-06-26

## 2023-06-26 PROBLEM — Z86.2: Status: RESOLVED | Noted: 2018-07-27 | Resolved: 2023-06-26

## 2023-06-26 PROBLEM — I63.9 STROKE (MULTI): Status: ACTIVE | Noted: 2023-06-26

## 2023-06-26 PROBLEM — Z98.890 HISTORY OF CERVICAL SPINAL SURGERY: Status: RESOLVED | Noted: 2018-01-22 | Resolved: 2023-06-26

## 2023-06-26 PROBLEM — Z86.0101 HISTORY OF ADENOMATOUS POLYP OF COLON: Status: RESOLVED | Noted: 2019-05-21 | Resolved: 2023-06-26

## 2023-06-26 PROBLEM — M26.629 TEMPOROMANDIBULAR JOINT PAIN DYSFUNCTION SYNDROME: Status: ACTIVE | Noted: 2023-06-26

## 2023-06-26 PROBLEM — M25.539 WRIST PAIN: Status: RESOLVED | Noted: 2023-03-23 | Resolved: 2023-06-26

## 2023-06-26 PROBLEM — J41.0 SIMPLE CHRONIC BRONCHITIS (MULTI): Status: RESOLVED | Noted: 2018-02-15 | Resolved: 2023-06-26

## 2023-06-26 PROBLEM — J44.1 COPD WITH EXACERBATION (MULTI): Status: RESOLVED | Noted: 2018-02-15 | Resolved: 2023-06-26

## 2023-06-26 PROBLEM — H92.09 EARACHE: Status: RESOLVED | Noted: 2023-03-23 | Resolved: 2023-06-26

## 2023-06-26 PROBLEM — M25.532 LEFT WRIST PAIN: Status: RESOLVED | Noted: 2023-03-23 | Resolved: 2023-06-26

## 2023-06-26 NOTE — PROGRESS NOTES
Subjective     HPI   Linsey Donis is a 82 y.o. year old female patient with presenting to clinic with concern for   Chief Complaint   Patient presents with    New Patient Visit       Ms. Donis presents to clinic to establish care. Mrs. Donis needs refills of medications    Has xanax for anxiety No other meds have worked. She has tried SSNIs and SSRIs and other atypicals.     Hx rheumatic fever          Patient Active Problem List   Diagnosis    Allergic rhinitis    Anxiety disorder    Chronic fatigue    Chronic neck pain    Disorder of jaw    Dizziness    DM2 (diabetes mellitus, type 2) (CMS/[facility])    Dyslipidemia    Adult hypothyroidism    Enlarged thyroid    GERD (gastroesophageal reflux disease)    Gout    Hearing loss    HTN (hypertension)    Insomnia    Polyarthralgia    Vitamin D deficiency    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HCC)    Sjogren's syndrome with keratoconjunctivitis sicca (CMS/HCC)    Osteomyelitis (CMS/HCC)    Simple chronic bronchitis (CMS/[facility])    Deep vein thrombosis (DVT) of other vein of lower extremity, unspecified chronicity, unspecified laterality (CMS/HCC)    Cervical radiculopathy    DDD (degenerative disc disease), lumbosacral    Edema of both legs    History of stroke    Knee pain, bilateral    Obstructive sleep apnea syndrome    Postlaminectomy syndrome, lumbar region    Temporomandibular joint pain dysfunction syndrome    Stroke (CMS/[facility])    Urine test positive for microalbuminuria    Chronic pain syndrome    Sjogren's syndrome (CMS/HCC)    Mixed simple and mucopurulent chronic bronchitis (CMS/[facility])       Past Medical History:   Diagnosis Date    Acute bilateral low back pain with right-sided sciatica 06/15/2018    COPD with exacerbation (CMS/HCC) 02/15/2018    Esophagitis 02/12/2014    History of cervical spinal surgery 01/22/2018    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    History of lumbar surgery 01/22/2018    Left sided sciatica 12/02/2014    Other conditions influencing  "health status     Stroke syndrome    Otitis media, unspecified, bilateral 2022    Acute bilateral otitis media    Pain in right knee 11/10/2022    Acute pain of right knee    Personal history of other diseases of the digestive system     History of diverticulitis of colon    Personal history of other diseases of the digestive system     History of glossitis    Personal history of other diseases of the musculoskeletal system and connective tissue     History of fibromyositis    Personal history of other diseases of the musculoskeletal system and connective tissue     History of fibromyositis    Personal history of other diseases of the nervous system and sense organs 04/15/2022    History of otitis media    Simple chronic bronchitis (CMS/[facility]) 02/15/2018    Sjogren syndrome, unspecified (CMS/[facility])     Sjogrens syndrome    Unspecified otitis externa, unspecified ear 04/15/2022    Otitis externa      Past Surgical History:   Procedure Laterality Date    CATARACT EXTRACTION  11/15/2012    Cataract Surgery    CATARACT EXTRACTION  2013    Cataract Surgery    COLECTOMY      8\" colon removed    HYSTERECTOMY  11/15/2012    Hysterectomy    HYSTERECTOMY  2013    Hysterectomy    MR HEAD ANGIO WO IV CONTRAST  2023    MR HEAD ANGIO WO IV CONTRAST 2023 GEN MRI    MR NECK ANGIO WO IV CONTRAST  2023    MR NECK ANGIO WO IV CONTRAST 2023 GEN MRI    OTHER SURGICAL HISTORY  2013    Fusion / Refusion Of Vertebrae      Family History   Problem Relation Name Age of Onset    No Known Problems Mother      No Known Problems Father        Social History     Tobacco Use    Smoking status: Former     Packs/day: 1.00     Years: 20.00     Total pack years: 20.00     Types: Cigarettes     Start date:      Quit date:      Years since quittin.5    Smokeless tobacco: Never   Substance Use Topics    Alcohol use: Never        Current Outpatient Medications:     albuterol 2.5 mg /3 mL (0.083 %) " nebulizer solution, USE 1 UNIT VIA NEBULIZER FOUR TIMES DAILY, Disp: , Rfl:     ALPRAZolam (Xanax) 0.25 mg tablet, Take 1 tablet (0.25 mg) by mouth 2 times a day as needed for anxiety., Disp: 60 tablet, Rfl: 0    [START ON 7/28/2023] ALPRAZolam (Xanax) 0.25 mg tablet, Take 1 tablet (0.25 mg) by mouth 2 times a day as needed for anxiety. Do not start before July 28, 2023., Disp: 30 tablet, Rfl: 0    [START ON 8/28/2023] ALPRAZolam (Xanax) 0.25 mg tablet, Take 1 tablet (0.25 mg) by mouth 2 times a day as needed for anxiety. Do not start before August 28, 2023., Disp: 30 tablet, Rfl: 0    aspirin 81 mg EC tablet, Take 1 tablet (81 mg) by mouth once daily., Disp: 90 tablet, Rfl: 1    calcium citrate-vitamin D3 (Citracal + D Maximum) 315 mg-6.25 mcg (250 unit) tablet, Take 1 tablet (315 mg) by mouth 2 times a day., Disp: 180 tablet, Rfl: 1    estradiol (Estrace) 0.01 % (0.1 mg/gram) vaginal cream, Apply pea sized amount into vagina nightly for 1 week then every Monday/Wednesday/Friday., Disp: 85 g, Rfl: 0    folic acid (Folvite) 1 mg tablet, Take 1 tablet (1 mg) by mouth once daily., Disp: 90 tablet, Rfl: 1    labetalol (Normodyne) 200 mg tablet, Take 1 tablet (200 mg) by mouth 2 times a day., Disp: 180 tablet, Rfl: 1    latanoprost (Xalatan) 0.005 % ophthalmic solution, Latanoprost 0.005 % Ophthalmic Solution  Quantity: 8  Refills: 0      Start : 24-May-2021  Active, Disp: , Rfl:     levothyroxine (Synthroid) 50 mcg tablet, Take 1 tablet (50 mcg) by mouth once daily., Disp: 90 tablet, Rfl: 0    meclizine (Antivert) 25 mg tablet, Take 1 tablet (25 mg) by mouth 2 times a day as needed for dizziness for up to 30 doses., Disp: 30 tablet, Rfl: 0    metFORMIN (Glucophage) 500 mg tablet, Take 1 tablet (500 mg) by mouth 2 times a day., Disp: 180 tablet, Rfl: 1    nitrofurantoin, macrocrystal-monohydrate, (Macrobid) 100 mg capsule, Take 1 capsule (100 mg) by mouth 2 times a day for 7 days., Disp: 14 capsule, Rfl: 0    omeprazole  "(PriLOSEC) 20 mg DR capsule, Take 1 capsule (20 mg) by mouth once daily in the morning. Take before meals. Do not crush or chew., Disp: 90 capsule, Rfl: 1    oxygen (O2) therapy, Inhale 3 L/min at 180,000 mL/hr once daily at bedtime. via nasal canula, Disp: , Rfl:     simvastatin (Zocor) 20 mg tablet, Take 1 tablet (20 mg) by mouth once daily., Disp: 90 tablet, Rfl: 1     Review of Systems  Review of Systems:   Constitutional: Denies fever  HEENT: Denies ST, earache  CVS: Denies Chest pain  Pulmonary: Denies wheezing, SOB  GI: Denies N/V  : Denies dysuria  Musculoskeletal:  Denies myalgia  Neuro: Denies focal weakness or numbness.  Skin: Denies Rashes.  *Review of Systems is negative unless otherwise mentioned in HPI or ROS above.    Objective   /82   Pulse 78   Ht 1.575 m (5' 2\")   Wt 74 kg (163 lb 2 oz)   SpO2 97%   BMI 29.84 kg/m²     Physical Exam  Physical exam:  /82   Pulse 78   Ht 1.575 m (5' 2\")   Wt 74 kg (163 lb 2 oz)   SpO2 97%   BMI 29.84 kg/m²  reviewed   Body mass index is 29.84 kg/m².   Constitutional: NAD.  Resting comfortably.  Head: Atraumatic, normocephalic.  EENT: deferred d/t masking  Cardiac: Regular rate & rhythm.   Pulmonary: Lungs clear bilat  GI: Soft, Nontender, nondistended.   Musculoskeletal: No peripheral edema.   Skin: No evidence of trauma. No rashes  Psych: Intact judgement and insight.    .Assessment/Plan     Problem List Items Addressed This Visit       Chronic fatigue    Relevant Medications    folic acid (Folvite) 1 mg tablet    DM2 (diabetes mellitus, type 2) (CMS/HCC)    Relevant Medications    metFORMIN (Glucophage) 500 mg tablet    Dyslipidemia    Relevant Medications    simvastatin (Zocor) 20 mg tablet    aspirin 81 mg EC tablet    Adult hypothyroidism    Relevant Medications    levothyroxine (Synthroid) 50 mcg tablet    GERD (gastroesophageal reflux disease)    Relevant Medications    omeprazole (PriLOSEC) 20 mg DR capsule    HTN (hypertension)    " Relevant Medications    labetalol (Normodyne) 200 mg tablet     Other Visit Diagnoses       Anxiety    -  Primary    Relevant Medications    ALPRAZolam (Xanax) 0.25 mg tablet    ALPRAZolam (Xanax) 0.25 mg tablet (Start on 7/28/2023)    ALPRAZolam (Xanax) 0.25 mg tablet (Start on 8/28/2023)    Osteopenia of multiple sites        Relevant Medications    calcium citrate-vitamin D3 (Citracal + D Maximum) 315 mg-6.25 mcg (250 unit) tablet    Vaginal dryness, menopausal        Relevant Medications    estradiol (Estrace) 0.01 % (0.1 mg/gram) vaginal cream    Vertigo        Relevant Medications    meclizine (Antivert) 25 mg tablet    Other Relevant Orders    POCT UA Automated manually resulted (Completed)    Urinary tract infection without hematuria, site unspecified        Relevant Orders    Urine Culture    Recurrent UTI        Relevant Medications    nitrofurantoin, macrocrystal-monohydrate, (Macrobid) 100 mg capsule

## 2023-06-28 ENCOUNTER — OFFICE VISIT (OUTPATIENT)
Dept: PRIMARY CARE | Facility: CLINIC | Age: 83
End: 2023-06-28
Payer: MEDICARE

## 2023-06-28 VITALS
HEART RATE: 78 BPM | OXYGEN SATURATION: 97 % | WEIGHT: 163.13 LBS | SYSTOLIC BLOOD PRESSURE: 146 MMHG | DIASTOLIC BLOOD PRESSURE: 82 MMHG | BODY MASS INDEX: 30.02 KG/M2 | HEIGHT: 62 IN

## 2023-06-28 DIAGNOSIS — E11.9 TYPE 2 DIABETES MELLITUS WITHOUT COMPLICATION, WITHOUT LONG-TERM CURRENT USE OF INSULIN (MULTI): ICD-10-CM

## 2023-06-28 DIAGNOSIS — R42 VERTIGO: ICD-10-CM

## 2023-06-28 DIAGNOSIS — N39.0 URINARY TRACT INFECTION WITHOUT HEMATURIA, SITE UNSPECIFIED: ICD-10-CM

## 2023-06-28 DIAGNOSIS — I10 PRIMARY HYPERTENSION: ICD-10-CM

## 2023-06-28 DIAGNOSIS — M85.89 OSTEOPENIA OF MULTIPLE SITES: ICD-10-CM

## 2023-06-28 DIAGNOSIS — K21.9 GASTROESOPHAGEAL REFLUX DISEASE, UNSPECIFIED WHETHER ESOPHAGITIS PRESENT: ICD-10-CM

## 2023-06-28 DIAGNOSIS — E03.9 ADULT HYPOTHYROIDISM: ICD-10-CM

## 2023-06-28 DIAGNOSIS — F41.9 ANXIETY: Primary | ICD-10-CM

## 2023-06-28 DIAGNOSIS — N39.0 RECURRENT UTI: ICD-10-CM

## 2023-06-28 DIAGNOSIS — N95.1 VAGINAL DRYNESS, MENOPAUSAL: ICD-10-CM

## 2023-06-28 DIAGNOSIS — E78.5 DYSLIPIDEMIA: ICD-10-CM

## 2023-06-28 DIAGNOSIS — R53.82 CHRONIC FATIGUE: ICD-10-CM

## 2023-06-28 PROBLEM — J41.8 MIXED SIMPLE AND MUCOPURULENT CHRONIC BRONCHITIS (MULTI): Status: ACTIVE | Noted: 2023-06-28

## 2023-06-28 LAB
POC APPEARANCE, URINE: CLEAR
POC BILIRUBIN, URINE: NEGATIVE
POC BLOOD, URINE: NEGATIVE
POC COLOR, URINE: ABNORMAL
POC GLUCOSE, URINE: NEGATIVE MG/DL
POC KETONES, URINE: ABNORMAL MG/DL
POC LEUKOCYTES, URINE: ABNORMAL
POC NITRITE,URINE: NEGATIVE
POC PH, URINE: 6 PH
POC PROTEIN, URINE: ABNORMAL MG/DL
POC SPECIFIC GRAVITY, URINE: 1.02
POC UROBILINOGEN, URINE: 0.2 EU/DL

## 2023-06-28 PROCEDURE — 1036F TOBACCO NON-USER: CPT | Performed by: PHYSICIAN ASSISTANT

## 2023-06-28 PROCEDURE — 99214 OFFICE O/P EST MOD 30 MIN: CPT | Performed by: PHYSICIAN ASSISTANT

## 2023-06-28 PROCEDURE — 1160F RVW MEDS BY RX/DR IN RCRD: CPT | Performed by: PHYSICIAN ASSISTANT

## 2023-06-28 PROCEDURE — 3077F SYST BP >= 140 MM HG: CPT | Performed by: PHYSICIAN ASSISTANT

## 2023-06-28 PROCEDURE — 3079F DIAST BP 80-89 MM HG: CPT | Performed by: PHYSICIAN ASSISTANT

## 2023-06-28 PROCEDURE — 87086 URINE CULTURE/COLONY COUNT: CPT

## 2023-06-28 PROCEDURE — 81003 URINALYSIS AUTO W/O SCOPE: CPT | Performed by: PHYSICIAN ASSISTANT

## 2023-06-28 PROCEDURE — 1159F MED LIST DOCD IN RCRD: CPT | Performed by: PHYSICIAN ASSISTANT

## 2023-06-28 RX ORDER — MECLIZINE HYDROCHLORIDE 25 MG/1
25 TABLET ORAL 2 TIMES DAILY PRN
Qty: 30 TABLET | Refills: 0 | Status: SHIPPED | OUTPATIENT
Start: 2023-06-28 | End: 2024-03-05 | Stop reason: SDUPTHER

## 2023-06-28 RX ORDER — METFORMIN HYDROCHLORIDE 500 MG/1
500 TABLET ORAL 2 TIMES DAILY
Qty: 180 TABLET | Refills: 1 | Status: SHIPPED | OUTPATIENT
Start: 2023-06-28 | End: 2023-09-26 | Stop reason: SDUPTHER

## 2023-06-28 RX ORDER — MECLIZINE HYDROCHLORIDE 25 MG/1
25 TABLET ORAL 2 TIMES DAILY PRN
COMMUNITY
End: 2023-06-28 | Stop reason: SDUPTHER

## 2023-06-28 RX ORDER — CALCIUM CITRATE/VITAMIN D3 315MG-6.25
1 TABLET ORAL 2 TIMES DAILY
Qty: 180 TABLET | Refills: 1 | Status: SHIPPED | OUTPATIENT
Start: 2023-06-28 | End: 2023-06-28 | Stop reason: SDUPTHER

## 2023-06-28 RX ORDER — SIMVASTATIN 20 MG/1
20 TABLET, FILM COATED ORAL DAILY
Qty: 90 TABLET | Refills: 1 | Status: SHIPPED | OUTPATIENT
Start: 2023-06-28 | End: 2023-09-26 | Stop reason: SDUPTHER

## 2023-06-28 RX ORDER — LABETALOL 200 MG/1
1 TABLET, FILM COATED ORAL 2 TIMES DAILY
Qty: 180 TABLET | Refills: 1 | Status: SHIPPED | OUTPATIENT
Start: 2023-06-28 | End: 2023-09-26 | Stop reason: SDUPTHER

## 2023-06-28 RX ORDER — CALCIUM CITRATE/VITAMIN D3 315MG-6.25
1 TABLET ORAL 2 TIMES DAILY
Qty: 180 TABLET | Refills: 1 | Status: SHIPPED | OUTPATIENT
Start: 2023-06-28 | End: 2023-09-26 | Stop reason: SDUPTHER

## 2023-06-28 RX ORDER — LEVOTHYROXINE SODIUM 50 UG/1
50 TABLET ORAL DAILY
Qty: 90 TABLET | Refills: 0 | Status: SHIPPED | OUTPATIENT
Start: 2023-06-28 | End: 2023-09-26 | Stop reason: SDUPTHER

## 2023-06-28 RX ORDER — SIMVASTATIN 20 MG/1
20 TABLET, FILM COATED ORAL DAILY
Qty: 90 TABLET | Refills: 0 | Status: SHIPPED | OUTPATIENT
Start: 2023-06-28 | End: 2023-06-28 | Stop reason: SDUPTHER

## 2023-06-28 RX ORDER — FOLIC ACID 1 MG/1
1 TABLET ORAL DAILY
Qty: 30 TABLET | Refills: 1 | Status: SHIPPED | OUTPATIENT
Start: 2023-06-28 | End: 2023-06-28 | Stop reason: SDUPTHER

## 2023-06-28 RX ORDER — ALPRAZOLAM 0.25 MG/1
0.25 TABLET ORAL 2 TIMES DAILY PRN
Qty: 30 TABLET | Refills: 0 | Status: SHIPPED | OUTPATIENT
Start: 2023-08-28 | End: 2023-08-01 | Stop reason: DRUGHIGH

## 2023-06-28 RX ORDER — FOLIC ACID 1 MG/1
1 TABLET ORAL DAILY
Qty: 90 TABLET | Refills: 1 | Status: SHIPPED | OUTPATIENT
Start: 2023-06-28 | End: 2023-09-26 | Stop reason: SDUPTHER

## 2023-06-28 RX ORDER — NITROFURANTOIN 25; 75 MG/1; MG/1
100 CAPSULE ORAL 2 TIMES DAILY
Qty: 14 CAPSULE | Refills: 0 | Status: SHIPPED | OUTPATIENT
Start: 2023-06-28 | End: 2023-07-05

## 2023-06-28 RX ORDER — ASPIRIN 81 MG/1
81 TABLET ORAL DAILY
Qty: 90 TABLET | Refills: 1 | Status: SHIPPED | OUTPATIENT
Start: 2023-06-28 | End: 2023-12-25

## 2023-06-28 RX ORDER — ALPRAZOLAM 0.25 MG/1
0.25 TABLET ORAL 2 TIMES DAILY PRN
Qty: 60 TABLET | Refills: 0 | Status: SHIPPED | OUTPATIENT
Start: 2023-06-28 | End: 2023-09-26 | Stop reason: ALTCHOICE

## 2023-06-28 RX ORDER — METFORMIN HYDROCHLORIDE 500 MG/1
500 TABLET ORAL 2 TIMES DAILY
Qty: 180 TABLET | Refills: 0 | Status: SHIPPED | OUTPATIENT
Start: 2023-06-28 | End: 2023-06-28 | Stop reason: SDUPTHER

## 2023-06-28 RX ORDER — LEVOTHYROXINE SODIUM 50 UG/1
50 TABLET ORAL DAILY
Qty: 90 TABLET | Refills: 0 | Status: SHIPPED | OUTPATIENT
Start: 2023-06-28 | End: 2023-06-28 | Stop reason: SDUPTHER

## 2023-06-28 RX ORDER — ALPRAZOLAM 0.25 MG/1
0.25 TABLET ORAL 2 TIMES DAILY PRN
Qty: 30 TABLET | Refills: 0 | Status: SHIPPED | OUTPATIENT
Start: 2023-07-28 | End: 2023-08-01 | Stop reason: DRUGHIGH

## 2023-06-28 RX ORDER — ESTRADIOL 0.1 MG/G
CREAM VAGINAL
Qty: 85 G | Refills: 0 | Status: SHIPPED | OUTPATIENT
Start: 2023-06-28 | End: 2023-09-26 | Stop reason: SDUPTHER

## 2023-06-28 RX ORDER — OMEPRAZOLE 20 MG/1
20 CAPSULE, DELAYED RELEASE ORAL
Qty: 90 CAPSULE | Refills: 1 | Status: SHIPPED | OUTPATIENT
Start: 2023-06-28 | End: 2023-09-26 | Stop reason: WASHOUT

## 2023-06-28 RX ORDER — OMEPRAZOLE 20 MG/1
20 CAPSULE, DELAYED RELEASE ORAL
Qty: 90 CAPSULE | Refills: 0 | Status: SHIPPED | OUTPATIENT
Start: 2023-06-28 | End: 2023-06-28 | Stop reason: SDUPTHER

## 2023-06-28 RX ORDER — ASPIRIN 81 MG/1
81 TABLET ORAL DAILY
COMMUNITY
End: 2023-06-28 | Stop reason: SDUPTHER

## 2023-06-28 RX ORDER — LABETALOL 200 MG/1
1 TABLET, FILM COATED ORAL 2 TIMES DAILY
Qty: 180 TABLET | Refills: 1 | Status: SHIPPED | OUTPATIENT
Start: 2023-06-28 | End: 2023-06-28 | Stop reason: SDUPTHER

## 2023-06-29 LAB — URINE CULTURE: NORMAL

## 2023-07-27 ENCOUNTER — OFFICE VISIT (OUTPATIENT)
Dept: PRIMARY CARE | Facility: CLINIC | Age: 83
End: 2023-07-27
Payer: MEDICARE

## 2023-07-27 VITALS
DIASTOLIC BLOOD PRESSURE: 60 MMHG | OXYGEN SATURATION: 94 % | SYSTOLIC BLOOD PRESSURE: 142 MMHG | HEIGHT: 62 IN | HEART RATE: 63 BPM | WEIGHT: 165 LBS | TEMPERATURE: 96.6 F | BODY MASS INDEX: 30.36 KG/M2

## 2023-07-27 DIAGNOSIS — R79.89 ELEVATED TSH: ICD-10-CM

## 2023-07-27 DIAGNOSIS — N39.0 URINARY TRACT INFECTION WITHOUT HEMATURIA, SITE UNSPECIFIED: ICD-10-CM

## 2023-07-27 DIAGNOSIS — F41.3 OTHER MIXED ANXIETY DISORDERS: ICD-10-CM

## 2023-07-27 DIAGNOSIS — M79.671 FOOT PAIN, RIGHT: Primary | ICD-10-CM

## 2023-07-27 DIAGNOSIS — Z79.899 CONTROLLED SUBSTANCE AGREEMENT SIGNED: ICD-10-CM

## 2023-07-27 LAB
POC APPEARANCE, URINE: CLEAR
POC BILIRUBIN, URINE: ABNORMAL
POC BLOOD, URINE: NEGATIVE
POC COLOR, URINE: ABNORMAL
POC GLUCOSE, URINE: NEGATIVE MG/DL
POC KETONES, URINE: NEGATIVE MG/DL
POC LEUKOCYTES, URINE: ABNORMAL
POC NITRITE,URINE: NEGATIVE
POC PH, URINE: 5.5 PH
POC PROTEIN, URINE: ABNORMAL MG/DL
POC SPECIFIC GRAVITY, URINE: >=1.03
POC UROBILINOGEN, URINE: 0.2 EU/DL

## 2023-07-27 PROCEDURE — 1126F AMNT PAIN NOTED NONE PRSNT: CPT | Performed by: PHYSICIAN ASSISTANT

## 2023-07-27 PROCEDURE — 80365 DRUG SCREENING OXYCODONE: CPT

## 2023-07-27 PROCEDURE — 80354 DRUG SCREENING FENTANYL: CPT

## 2023-07-27 PROCEDURE — 80346 BENZODIAZEPINES1-12: CPT

## 2023-07-27 PROCEDURE — 87086 URINE CULTURE/COLONY COUNT: CPT

## 2023-07-27 PROCEDURE — 80307 DRUG TEST PRSMV CHEM ANLYZR: CPT

## 2023-07-27 PROCEDURE — 1036F TOBACCO NON-USER: CPT | Performed by: PHYSICIAN ASSISTANT

## 2023-07-27 PROCEDURE — 80368 SEDATIVE HYPNOTICS: CPT

## 2023-07-27 PROCEDURE — 80361 OPIATES 1 OR MORE: CPT

## 2023-07-27 PROCEDURE — 99214 OFFICE O/P EST MOD 30 MIN: CPT | Performed by: PHYSICIAN ASSISTANT

## 2023-07-27 PROCEDURE — 3078F DIAST BP <80 MM HG: CPT | Performed by: PHYSICIAN ASSISTANT

## 2023-07-27 PROCEDURE — 80373 DRUG SCREENING TRAMADOL: CPT

## 2023-07-27 PROCEDURE — 1159F MED LIST DOCD IN RCRD: CPT | Performed by: PHYSICIAN ASSISTANT

## 2023-07-27 PROCEDURE — 81003 URINALYSIS AUTO W/O SCOPE: CPT | Performed by: PHYSICIAN ASSISTANT

## 2023-07-27 PROCEDURE — 1160F RVW MEDS BY RX/DR IN RCRD: CPT | Performed by: PHYSICIAN ASSISTANT

## 2023-07-27 PROCEDURE — 80358 DRUG SCREENING METHADONE: CPT

## 2023-07-27 PROCEDURE — 3077F SYST BP >= 140 MM HG: CPT | Performed by: PHYSICIAN ASSISTANT

## 2023-07-27 RX ORDER — DULOXETIN HYDROCHLORIDE 30 MG/1
30 CAPSULE, DELAYED RELEASE ORAL 2 TIMES DAILY
Qty: 60 CAPSULE | Refills: 5 | Status: SHIPPED | OUTPATIENT
Start: 2023-07-27 | End: 2023-09-26 | Stop reason: WASHOUT

## 2023-07-27 ASSESSMENT — PATIENT HEALTH QUESTIONNAIRE - PHQ9
2. FEELING DOWN, DEPRESSED OR HOPELESS: NOT AT ALL
SUM OF ALL RESPONSES TO PHQ9 QUESTIONS 1 AND 2: 0
1. LITTLE INTEREST OR PLEASURE IN DOING THINGS: NOT AT ALL

## 2023-07-27 NOTE — PROGRESS NOTES
Subjective     HPI   Linsey Donis is a 82 y.o. year old female patient with presenting to clinic with concern for   Chief Complaint   Patient presents with    Follow-up     Right foot has been bothering her.        Ms. Donis presents to follow up after initial visit last month. Concern for anxiety. Ongoing knee and back pain.    Right foot pain for the past few days. NKI. Right midfoot.    Anxiety is really bothering her and pain in back and knees. She saw orthopedic spine who recommended against surgical intervention. Not interested in pain management after issues. She has been on cymbalta before- says she has not had bad reaction to it, says she is not allergic. She was on 4 meds at the same time and upset staomache was not related to the cymbalta. Wants to try this again.    She and her  are trying to downsize into a smaller home with less maintenance.     Patient Active Problem List   Diagnosis    Allergic rhinitis    Anxiety disorder    Chronic fatigue    Chronic neck pain    Disorder of jaw    Dizziness    DM2 (diabetes mellitus, type 2) (CMS/HCC)    Dyslipidemia    Adult hypothyroidism    Enlarged thyroid    GERD (gastroesophageal reflux disease)    Gout    Hearing loss    HTN (hypertension)    Insomnia    Polyarthralgia    Vitamin D deficiency    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HCC)    Sjogren's syndrome with keratoconjunctivitis sicca (CMS/HCC)    Osteomyelitis (CMS/HCC)    Simple chronic bronchitis (CMS/HCC)    Deep vein thrombosis (DVT) of other vein of lower extremity, unspecified chronicity, unspecified laterality (CMS/HCC)    Cervical radiculopathy    DDD (degenerative disc disease), lumbosacral    Edema of both legs    History of stroke    Knee pain, bilateral    Obstructive sleep apnea syndrome    Postlaminectomy syndrome, lumbar region    Temporomandibular joint pain dysfunction syndrome    Stroke (CMS/HCC)    Urine test positive for microalbuminuria    Chronic pain syndrome    Sjogren's  "syndrome (CMS/[facility])    Mixed simple and mucopurulent chronic bronchitis (CMS/[facility])       Past Medical History:   Diagnosis Date    Acute bilateral low back pain with right-sided sciatica 06/15/2018    COPD with exacerbation (CMS/[facility]) 02/15/2018    Esophagitis 02/12/2014    History of cervical spinal surgery 01/22/2018    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    History of lumbar surgery 01/22/2018    Left sided sciatica 12/02/2014    Other conditions influencing health status     Stroke syndrome    Otitis media, unspecified, bilateral 04/12/2022    Acute bilateral otitis media    Pain in right knee 11/10/2022    Acute pain of right knee    Personal history of other diseases of the digestive system     History of diverticulitis of colon    Personal history of other diseases of the digestive system     History of glossitis    Personal history of other diseases of the musculoskeletal system and connective tissue     History of fibromyositis    Personal history of other diseases of the musculoskeletal system and connective tissue     History of fibromyositis    Personal history of other diseases of the nervous system and sense organs 04/15/2022    History of otitis media    Simple chronic bronchitis (CMS/[facility]) 02/15/2018    Sjogren syndrome, unspecified (CMS/[facility])     Sjogrens syndrome    Unspecified otitis externa, unspecified ear 04/15/2022    Otitis externa      Past Surgical History:   Procedure Laterality Date    CATARACT EXTRACTION  11/15/2012    Cataract Surgery    CATARACT EXTRACTION  04/08/2013    Cataract Surgery    COLECTOMY      8\" colon removed    HYSTERECTOMY  11/15/2012    Hysterectomy    HYSTERECTOMY  04/08/2013    Hysterectomy    MR HEAD ANGIO WO IV CONTRAST  06/19/2023    MR HEAD ANGIO WO IV CONTRAST 6/19/2023 GEN MRI    MR NECK ANGIO WO IV CONTRAST  06/19/2023    MR NECK ANGIO WO IV CONTRAST 6/19/2023 GEN MRI    OTHER SURGICAL HISTORY  04/08/2013    Fusion / Refusion Of Vertebrae      Family " History   Problem Relation Name Age of Onset    No Known Problems Mother      No Known Problems Father        Social History     Tobacco Use    Smoking status: Former     Packs/day: 1.00     Years: 20.00     Total pack years: 20.00     Types: Cigarettes     Start date:      Quit date:      Years since quittin.5    Smokeless tobacco: Never   Substance Use Topics    Alcohol use: Never        Current Outpatient Medications:     albuterol 2.5 mg /3 mL (0.083 %) nebulizer solution, USE 1 UNIT VIA NEBULIZER FOUR TIMES DAILY, Disp: , Rfl:     ALPRAZolam (Xanax) 0.25 mg tablet, Take 1 tablet (0.25 mg) by mouth 2 times a day as needed for anxiety., Disp: 60 tablet, Rfl: 0    [START ON 2023] ALPRAZolam (Xanax) 0.25 mg tablet, Take 1 tablet (0.25 mg) by mouth 2 times a day as needed for anxiety. Do not start before 2023., Disp: 30 tablet, Rfl: 0    [START ON 2023] ALPRAZolam (Xanax) 0.25 mg tablet, Take 1 tablet (0.25 mg) by mouth 2 times a day as needed for anxiety. Do not start before 2023., Disp: 30 tablet, Rfl: 0    aspirin 81 mg EC tablet, Take 1 tablet (81 mg) by mouth once daily., Disp: 90 tablet, Rfl: 1    calcium citrate-vitamin D3 (Citracal + D Maximum) 315 mg-6.25 mcg (250 unit) tablet, Take 1 tablet (315 mg) by mouth 2 times a day., Disp: 180 tablet, Rfl: 1    estradiol (Estrace) 0.01 % (0.1 mg/gram) vaginal cream, Apply pea sized amount into vagina nightly for 1 week then every Monday/Wednesday/Friday., Disp: 85 g, Rfl: 0    folic acid (Folvite) 1 mg tablet, Take 1 tablet (1 mg) by mouth once daily., Disp: 90 tablet, Rfl: 1    labetalol (Normodyne) 200 mg tablet, Take 1 tablet (200 mg) by mouth 2 times a day., Disp: 180 tablet, Rfl: 1    latanoprost (Xalatan) 0.005 % ophthalmic solution, Latanoprost 0.005 % Ophthalmic Solution  Quantity: 8  Refills: 0      Start : 24-May-2021  Active, Disp: , Rfl:     levothyroxine (Synthroid) 50 mcg tablet, Take 1 tablet (50 mcg) by  "mouth once daily., Disp: 90 tablet, Rfl: 0    meclizine (Antivert) 25 mg tablet, Take 1 tablet (25 mg) by mouth 2 times a day as needed for dizziness for up to 30 doses., Disp: 30 tablet, Rfl: 0    metFORMIN (Glucophage) 500 mg tablet, Take 1 tablet (500 mg) by mouth 2 times a day., Disp: 180 tablet, Rfl: 1    omeprazole (PriLOSEC) 20 mg DR capsule, Take 1 capsule (20 mg) by mouth once daily in the morning. Take before meals. Do not crush or chew., Disp: 90 capsule, Rfl: 1    oxygen (O2) therapy, Inhale 3 L/min at 180,000 mL/hr once daily at bedtime. via nasal canula, Disp: , Rfl:     simvastatin (Zocor) 20 mg tablet, Take 1 tablet (20 mg) by mouth once daily., Disp: 90 tablet, Rfl: 1    DULoxetine (Cymbalta) 30 mg DR capsule, Take 1 capsule (30 mg) by mouth 2 times a day. Do not crush or chew., Disp: 60 capsule, Rfl: 5     Review of Systems  Review of Systems:   Constitutional: Denies fever  HEENT: Denies ST, earache  CVS: Denies Chest pain  Pulmonary: Denies wheezing, SOB  GI: Denies N/V  : Denies dysuria  Musculoskeletal:  Denies myalgia  Neuro: Denies focal weakness or numbness.  Skin: Denies Rashes.  *Review of Systems is negative unless otherwise mentioned in HPI or ROS above.    Objective   /60   Pulse 63   Temp 35.9 °C (96.6 °F)   Ht 1.575 m (5' 2\")   Wt 74.8 kg (165 lb)   SpO2 94%   BMI 30.18 kg/m²     Physical Exam  Physical exam:  /60   Pulse 63   Temp 35.9 °C (96.6 °F)   Ht 1.575 m (5' 2\")   Wt 74.8 kg (165 lb)   SpO2 94%   BMI 30.18 kg/m²  reviewed   Body mass index is 30.18 kg/m².   Constitutional: NAD.  Resting comfortably.  Head: Atraumatic, normocephalic.  EENT: deferred d/t masking  Cardiac: Regular rate & rhythm.   Pulmonary: Lungs clear bilat  GI: Soft, Nontender, nondistended.   Musculoskeletal: 1+ pedal peripheral edema bilat. Right midfoot tenderness, mild. No deformities/  Skin: No evidence of trauma. No rashes  Psych: Intact judgement and insight.    Recent Results " (from the past 12 hour(s))   POCT UA Automated manually resulted    Collection Time: 07/27/23 12:29 PM   Result Value Ref Range    POC Color, Urine Lu[place]ia (A) Straw, Yellow, Light Yellow    POC Appearance, Urine Clear Clear    POC Specific Gravity, Urine >=1.030 1.005 - 1.035    POC PH, Urine 5.5 No Reference Range Established PH    POC Protein, Urine 30 (1+) NEGATIVE, 30 (1+) mg/dl    POC Glucose, Urine NEGATIVE NEGATIVE mg/dl    POC Blood, Urine NEGATIVE NEGATIVE    POC Ketones, Urine NEGATIVE NEGATIVE mg/dl    POC Bilirubin, Urine SMALL (1+) (A) NEGATIVE    POC Urobilinogen, Urine 0.2 0.2, 1.0 EU/DL    Poc Nitrate, Urine NEGATIVE NEGATIVE    POC Leukocytes, Urine TRACE (A) NEGATIVE         .Assessment/Plan   Problem List Items Addressed This Visit       Anxiety disorder    Relevant Medications    DULoxetine (Cymbalta) 30 mg DR capsule     Other Visit Diagnoses       Foot pain, right    -  Primary    Relevant Orders    XR foot right 3+ views    Controlled substance agreement signed        Relevant Orders    Opiate/Opioid/Benzo Extended Prescription Compliance    Elevated TSH        Urinary tract infection without hematuria, site unspecified        Relevant Orders    Urine Culture    POCT UA Automated manually resulted (Completed)

## 2023-07-29 LAB — URINE CULTURE: NORMAL

## 2023-08-01 ENCOUNTER — TELEPHONE (OUTPATIENT)
Dept: PRIMARY CARE | Facility: CLINIC | Age: 83
End: 2023-08-01
Payer: MEDICARE

## 2023-08-01 DIAGNOSIS — F41.9 ANXIETY: ICD-10-CM

## 2023-08-01 LAB
6-ACETYLMORPHINE: <25 NG/ML
7-AMINOCLONAZEPAM: <25 NG/ML
ALPHA-HYDROXYALPRAZOLAM: 165 NG/ML
ALPHA-HYDROXYMIDAZOLAM: <25 NG/ML
ALPRAZOLAM: 113 NG/ML
AMPHETAMINE (PRESENCE) IN URINE BY SCREEN METHOD: ABNORMAL
BARBITURATES PRESENCE IN URINE BY SCREEN METHOD: ABNORMAL
CANNABINOIDS IN URINE BY SCREEN METHOD: ABNORMAL
CHLORDIAZEPOXIDE: <25 NG/ML
CLONAZEPAM: <25 NG/ML
COCAINE (PRESENCE) IN URINE BY SCREEN METHOD: ABNORMAL
CODEINE: <50 NG/ML
CREATINE, URINE FOR DRUG: 207.1 MG/DL
DIAZEPAM: <25 NG/ML
DRUG SCREEN COMMENT URINE: ABNORMAL
EDDP: <25 NG/ML
FENTANYL CONFIRMATION, URINE: <2.5 NG/ML
HYDROCODONE: <25 NG/ML
HYDROMORPHONE: <25 NG/ML
LORAZEPAM: <25 NG/ML
METHADONE CONFIRMATION,URINE: <25 NG/ML
MIDAZOLAM: <25 NG/ML
MORPHINE URINE: <50 NG/ML
NORDIAZEPAM: <25 NG/ML
NORFENTANYL: <2.5 NG/ML
NORHYDROCODONE: <25 NG/ML
NOROXYCODONE: <25 NG/ML
O-DESMETHYLTRAMADOL: <50 NG/ML
OXAZEPAM: <25 NG/ML
OXYCODONE: <25 NG/ML
OXYMORPHONE: <25 NG/ML
PHENCYCLIDINE (PRESENCE) IN URINE BY SCREEN METHOD: ABNORMAL
TEMAZEPAM: <25 NG/ML
TRAMADOL: <50 NG/ML
ZOLPIDEM METABOLITE (ZCA): <25 NG/ML
ZOLPIDEM: <25 NG/ML

## 2023-08-01 RX ORDER — ALPRAZOLAM 0.25 MG/1
0.25 TABLET ORAL 2 TIMES DAILY PRN
Qty: 60 TABLET | Refills: 0 | Status: SHIPPED | OUTPATIENT
Start: 2023-08-27 | End: 2023-09-26 | Stop reason: ALTCHOICE

## 2023-08-01 RX ORDER — ALPRAZOLAM 0.25 MG/1
0.25 TABLET ORAL 2 TIMES DAILY PRN
Qty: 30 TABLET | Refills: 0 | Status: SHIPPED | OUTPATIENT
Start: 2023-08-01 | End: 2023-08-02 | Stop reason: SDUPTHER

## 2023-08-01 NOTE — PROGRESS NOTES
Error in alprazolam Qty. Dispensation should have been Qty 60 for 30 days BID PRN, not 30 tablets. Correction sent to pharmacy after review of Rx and OARRS report.

## 2023-08-02 ENCOUNTER — TELEPHONE (OUTPATIENT)
Dept: PRIMARY CARE | Facility: CLINIC | Age: 83
End: 2023-08-02
Payer: MEDICARE

## 2023-08-02 DIAGNOSIS — F41.9 ANXIETY: ICD-10-CM

## 2023-08-02 RX ORDER — ALPRAZOLAM 0.25 MG/1
0.25 TABLET ORAL 2 TIMES DAILY PRN
Qty: 60 TABLET | Refills: 0 | Status: SHIPPED | OUTPATIENT
Start: 2023-08-02 | End: 2023-09-26 | Stop reason: ALTCHOICE

## 2023-08-23 LAB — URINE CULTURE: NORMAL

## 2023-09-11 ENCOUNTER — TELEMEDICINE (OUTPATIENT)
Dept: PRIMARY CARE | Facility: CLINIC | Age: 83
End: 2023-09-11
Payer: MEDICARE

## 2023-09-11 DIAGNOSIS — J44.1 COPD WITH ACUTE EXACERBATION (MULTI): Primary | ICD-10-CM

## 2023-09-11 DIAGNOSIS — R05.2 SUBACUTE COUGH: ICD-10-CM

## 2023-09-11 PROCEDURE — 99442 PR PHYS/QHP TELEPHONE EVALUATION 11-20 MIN: CPT | Performed by: PHYSICIAN ASSISTANT

## 2023-09-11 RX ORDER — DOXYCYCLINE 100 MG/1
100 CAPSULE ORAL 2 TIMES DAILY
Qty: 20 CAPSULE | Refills: 0 | Status: SHIPPED | OUTPATIENT
Start: 2023-09-11 | End: 2023-09-26 | Stop reason: WASHOUT

## 2023-09-11 RX ORDER — PREDNISONE 20 MG/1
TABLET ORAL
Qty: 18 TABLET | Refills: 0 | Status: SHIPPED | OUTPATIENT
Start: 2023-09-11 | End: 2023-09-26 | Stop reason: WASHOUT

## 2023-09-11 NOTE — PROGRESS NOTES
Virtual or Telephone Consent    A telephone visit (audio only) between the patient (at the originating site) and the provider (at the distant site) was utilized to provide this telehealth service.   Verbal consent was requested and obtained from Linsey Donis on this date, 09/11/23 for a telehealth visit.       Subjective    Linsey Donis is a 83 y.o. year old female patient presenting for virtual visit   Chief Complaint   Patient presents with    Cough      Ty got covid 2 weeks ago. Sept 1st Linsey felt sick and went to COVID. They were both sick for their 40th anniversary. She went to [facility] and was put on PAXLOVID. Worsening over the weekend after initial improvement.   Productive cough, ches congestion.  Patient Active Problem List   Diagnosis    Allergic rhinitis    Anxiety disorder    Chronic fatigue    Chronic neck pain    Disorder of jaw    Dizziness    DM2 (diabetes mellitus, type 2) (CMS/HCC)    Dyslipidemia    Adult hypothyroidism    Enlarged thyroid    GERD (gastroesophageal reflux disease)    Gout    Hearing loss    HTN (hypertension)    Insomnia    Polyarthralgia    Vitamin D deficiency    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HCC)    Sjogren's syndrome with keratoconjunctivitis sicca (CMS/HCC)    Osteomyelitis (CMS/HCC)    Simple chronic bronchitis (CMS/HCC)    Deep vein thrombosis (DVT) of other vein of lower extremity, unspecified chronicity, unspecified laterality (CMS/HCC)    Cervical radiculopathy    DDD (degenerative disc disease), lumbosacral    Edema of both legs    History of stroke    Knee pain, bilateral    Obstructive sleep apnea syndrome    Postlaminectomy syndrome, lumbar region    Temporomandibular joint pain dysfunction syndrome    Stroke (CMS/HCC)    Urine test positive for microalbuminuria    Chronic pain syndrome    Sjogren's syndrome (CMS/HCC)    Mixed simple and mucopurulent chronic bronchitis (CMS/HCC)    Controlled substance agreement signed       Past Medical History:  "  Diagnosis Date    Acute bilateral low back pain with right-sided sciatica 06/15/2018    COPD with exacerbation (CMS/HCC) 02/15/2018    Esophagitis 02/12/2014    History of cervical spinal surgery 01/22/2018    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    History of lumbar surgery 01/22/2018    Left sided sciatica 12/02/2014    Other conditions influencing health status     Stroke syndrome    Otitis media, unspecified, bilateral 04/12/2022    Acute bilateral otitis media    Pain in right knee 11/10/2022    Acute pain of right knee    Personal history of other diseases of the digestive system     History of diverticulitis of colon    Personal history of other diseases of the digestive system     History of glossitis    Personal history of other diseases of the musculoskeletal system and connective tissue     History of fibromyositis    Personal history of other diseases of the musculoskeletal system and connective tissue     History of fibromyositis    Personal history of other diseases of the nervous system and sense organs 04/15/2022    History of otitis media    Simple chronic bronchitis (CMS/[facility]) 02/15/2018    Sjogren syndrome, unspecified (CMS/[facility])     Sjogrens syndrome    Unspecified otitis externa, unspecified ear 04/15/2022    Otitis externa      Past Surgical History:   Procedure Laterality Date    CATARACT EXTRACTION  11/15/2012    Cataract Surgery    CATARACT EXTRACTION  04/08/2013    Cataract Surgery    COLECTOMY      8\" colon removed    HYSTERECTOMY  11/15/2012    Hysterectomy    HYSTERECTOMY  04/08/2013    Hysterectomy    MR HEAD ANGIO WO IV CONTRAST  06/19/2023    MR HEAD ANGIO WO IV CONTRAST 6/19/2023 GEN MRI    MR NECK ANGIO WO IV CONTRAST  06/19/2023    MR NECK ANGIO WO IV CONTRAST 6/19/2023 GEN MRI    OTHER SURGICAL HISTORY  04/08/2013    Fusion / Refusion Of Vertebrae      Family History   Problem Relation Name Age of Onset    No Known Problems Mother      No Known Problems Father      "   Social History     Tobacco Use    Smoking status: Former     Packs/day: 1.00     Years: 20.00     Additional pack years: 0.00     Total pack years: 20.00     Types: Cigarettes     Start date:      Quit date:      Years since quittin.7    Smokeless tobacco: Never   Substance Use Topics    Alcohol use: Never        Current Outpatient Medications:     albuterol 2.5 mg /3 mL (0.083 %) nebulizer solution, USE 1 UNIT VIA NEBULIZER FOUR TIMES DAILY, Disp: , Rfl:     ALPRAZolam (Xanax) 0.25 mg tablet, Take 1 tablet (0.25 mg) by mouth 2 times a day as needed for anxiety., Disp: 60 tablet, Rfl: 0    ALPRAZolam (Xanax) 0.25 mg tablet, Take 1 tablet (0.25 mg) by mouth 2 times a day as needed for anxiety. Do not start before 2023., Disp: 60 tablet, Rfl: 0    ALPRAZolam (Xanax) 0.25 mg tablet, Take 1 tablet (0.25 mg) by mouth 2 times a day as needed for anxiety., Disp: 60 tablet, Rfl: 0    aspirin 81 mg EC tablet, Take 1 tablet (81 mg) by mouth once daily., Disp: 90 tablet, Rfl: 1    calcium citrate-vitamin D3 (Citracal + D Maximum) 315 mg-6.25 mcg (250 unit) tablet, Take 1 tablet (315 mg) by mouth 2 times a day., Disp: 180 tablet, Rfl: 1    dextromethorphan-guaifenesin (Mucinex DM)  mg 12 hr tablet, Take 1 tablet by mouth every 12 hours for 10 days. Do not crush, chew, or split., Disp: 20 tablet, Rfl: 0    doxycycline (Vibramycin) 100 mg capsule, Take 1 capsule (100 mg) by mouth 2 times a day for 10 days. Take with at least 8 ounces (large glass) of water, do not lie down for 30 minutes after, Disp: 20 capsule, Rfl: 0    DULoxetine (Cymbalta) 30 mg DR capsule, Take 1 capsule (30 mg) by mouth 2 times a day. Do not crush or chew., Disp: 60 capsule, Rfl: 5    estradiol (Estrace) 0.01 % (0.1 mg/gram) vaginal cream, Apply pea sized amount into vagina nightly for 1 week then every Monday/Wednesday/Friday., Disp: 85 g, Rfl: 0    folic acid (Folvite) 1 mg tablet, Take 1 tablet (1 mg) by mouth once daily.,  Disp: 90 tablet, Rfl: 1    labetalol (Normodyne) 200 mg tablet, Take 1 tablet (200 mg) by mouth 2 times a day., Disp: 180 tablet, Rfl: 1    latanoprost (Xalatan) 0.005 % ophthalmic solution, Latanoprost 0.005 % Ophthalmic Solution  Quantity: 8  Refills: 0      Start : 24-May-2021  Active, Disp: , Rfl:     levothyroxine (Synthroid) 50 mcg tablet, Take 1 tablet (50 mcg) by mouth once daily., Disp: 90 tablet, Rfl: 0    meclizine (Antivert) 25 mg tablet, Take 1 tablet (25 mg) by mouth 2 times a day as needed for dizziness for up to 30 doses., Disp: 30 tablet, Rfl: 0    metFORMIN (Glucophage) 500 mg tablet, Take 1 tablet (500 mg) by mouth 2 times a day., Disp: 180 tablet, Rfl: 1    omeprazole (PriLOSEC) 20 mg DR capsule, Take 1 capsule (20 mg) by mouth once daily in the morning. Take before meals. Do not crush or chew., Disp: 90 capsule, Rfl: 1    oxygen (O2) therapy, Inhale 3 L/min at 180,000 mL/hr once daily at bedtime. via nasal canula, Disp: , Rfl:     predniSONE (Deltasone) 20 mg tablet, Take 3 tabs (60mg) daily for 3 days, then take 2 tabs (40mg) daily for 3 days, then take 1 tab (20mg) daily for 3 days., Disp: 18 tablet, Rfl: 0    simvastatin (Zocor) 20 mg tablet, Take 1 tablet (20 mg) by mouth once daily., Disp: 90 tablet, Rfl: 1     Review of Systems    Review of Systems:   Constitutional: Denies fever  HEENT: Denies ST, earache  CVS: Denies Chest pain  Pulmonary: Denies wheezing, SOB  GI: Denies N/V  : Denies dysuria  Musculoskeletal:  Denies myalgia  Neuro: Denies focal weakness or numbness.  Skin: Denies Rashes.  *Review of Systems is negative unless otherwise mentioned in HPI or ROS above.     Objective    VITALS  Pt does not have vitals available at time of visit.    Exam    Unable to perform physical exam in virtual platform    Assessment/Plan    Problem List Items Addressed This Visit    None  Visit Diagnoses       COPD with acute exacerbation (CMS/[facility])    -  Primary    Relevant Medications     doxycycline (Vibramycin) 100 mg capsule    predniSONE (Deltasone) 20 mg tablet    dextromethorphan-guaifenesin (Mucinex DM)  mg 12 hr tablet    Subacute cough        Relevant Medications    doxycycline (Vibramycin) 100 mg capsule    dextromethorphan-guaifenesin (Mucinex DM)  mg 12 hr tablet           Problem List Items Addressed This Visit    None  Visit Diagnoses       COPD with acute exacerbation (CMS/HCC)    -  Primary    Relevant Medications    doxycycline (Vibramycin) 100 mg capsule    predniSONE (Deltasone) 20 mg tablet    dextromethorphan-guaifenesin (Mucinex DM)  mg 12 hr tablet    Subacute cough        Relevant Medications    doxycycline (Vibramycin) 100 mg capsule    dextromethorphan-guaifenesin (Mucinex DM)  mg 12 hr tablet                      DISCHARGE    Please schedule a follow up visit     Any prescriptions were sent to the pharmacy, please make sure to pick them up and call if there are any issues or questions.     Thank you for trusting me to be a part of your healthcare.    Take care!     Yessenia Stephen PA-C  [facility]  [phone]     Please feel free to call the office, or return a message if you have any questions or concerns.

## 2023-09-26 ENCOUNTER — OFFICE VISIT (OUTPATIENT)
Dept: PRIMARY CARE | Facility: CLINIC | Age: 83
End: 2023-09-26
Payer: MEDICARE

## 2023-09-26 VITALS
TEMPERATURE: 98.6 F | HEIGHT: 62 IN | OXYGEN SATURATION: 97 % | SYSTOLIC BLOOD PRESSURE: 130 MMHG | WEIGHT: 159.4 LBS | BODY MASS INDEX: 29.33 KG/M2 | DIASTOLIC BLOOD PRESSURE: 62 MMHG | HEART RATE: 72 BPM

## 2023-09-26 DIAGNOSIS — F41.9 ANXIETY: ICD-10-CM

## 2023-09-26 DIAGNOSIS — N39.0 RECURRENT UTI: ICD-10-CM

## 2023-09-26 DIAGNOSIS — R10.31 RIGHT INGUINAL PAIN: ICD-10-CM

## 2023-09-26 DIAGNOSIS — R53.82 CHRONIC FATIGUE: ICD-10-CM

## 2023-09-26 DIAGNOSIS — J41.0 SIMPLE CHRONIC BRONCHITIS (MULTI): ICD-10-CM

## 2023-09-26 DIAGNOSIS — M85.89 OSTEOPENIA OF MULTIPLE SITES: ICD-10-CM

## 2023-09-26 DIAGNOSIS — E78.5 DYSLIPIDEMIA: ICD-10-CM

## 2023-09-26 DIAGNOSIS — I10 PRIMARY HYPERTENSION: ICD-10-CM

## 2023-09-26 DIAGNOSIS — Z79.899 CONTROLLED SUBSTANCE AGREEMENT SIGNED: ICD-10-CM

## 2023-09-26 DIAGNOSIS — K63.5 POLYP OF COLON, UNSPECIFIED PART OF COLON, UNSPECIFIED TYPE: ICD-10-CM

## 2023-09-26 DIAGNOSIS — N95.1 VAGINAL DRYNESS, MENOPAUSAL: ICD-10-CM

## 2023-09-26 DIAGNOSIS — R31.9 URINARY TRACT INFECTION WITH HEMATURIA, SITE UNSPECIFIED: Primary | ICD-10-CM

## 2023-09-26 DIAGNOSIS — E11.9 TYPE 2 DIABETES MELLITUS WITHOUT COMPLICATION, WITHOUT LONG-TERM CURRENT USE OF INSULIN (MULTI): ICD-10-CM

## 2023-09-26 DIAGNOSIS — K21.9 GASTROESOPHAGEAL REFLUX DISEASE WITHOUT ESOPHAGITIS: ICD-10-CM

## 2023-09-26 DIAGNOSIS — R05.2 SUBACUTE COUGH: ICD-10-CM

## 2023-09-26 DIAGNOSIS — E03.9 ADULT HYPOTHYROIDISM: ICD-10-CM

## 2023-09-26 DIAGNOSIS — N39.0 URINARY TRACT INFECTION WITH HEMATURIA, SITE UNSPECIFIED: Primary | ICD-10-CM

## 2023-09-26 LAB
POC APPEARANCE, URINE: CLEAR
POC BILIRUBIN, URINE: ABNORMAL
POC BLOOD, URINE: NEGATIVE
POC COLOR, URINE: YELLOW
POC GLUCOSE, URINE: NEGATIVE MG/DL
POC KETONES, URINE: ABNORMAL MG/DL
POC LEUKOCYTES, URINE: ABNORMAL
POC NITRITE,URINE: NEGATIVE
POC PH, URINE: 6 PH
POC PROTEIN, URINE: ABNORMAL MG/DL
POC SPECIFIC GRAVITY, URINE: >=1.03
POC UROBILINOGEN, URINE: 0.2 EU/DL

## 2023-09-26 PROCEDURE — 1160F RVW MEDS BY RX/DR IN RCRD: CPT | Performed by: PHYSICIAN ASSISTANT

## 2023-09-26 PROCEDURE — 81003 URINALYSIS AUTO W/O SCOPE: CPT | Performed by: PHYSICIAN ASSISTANT

## 2023-09-26 PROCEDURE — 1036F TOBACCO NON-USER: CPT | Performed by: PHYSICIAN ASSISTANT

## 2023-09-26 PROCEDURE — 3078F DIAST BP <80 MM HG: CPT | Performed by: PHYSICIAN ASSISTANT

## 2023-09-26 PROCEDURE — 1159F MED LIST DOCD IN RCRD: CPT | Performed by: PHYSICIAN ASSISTANT

## 2023-09-26 PROCEDURE — 87086 URINE CULTURE/COLONY COUNT: CPT

## 2023-09-26 PROCEDURE — 99214 OFFICE O/P EST MOD 30 MIN: CPT | Performed by: PHYSICIAN ASSISTANT

## 2023-09-26 PROCEDURE — 1126F AMNT PAIN NOTED NONE PRSNT: CPT | Performed by: PHYSICIAN ASSISTANT

## 2023-09-26 PROCEDURE — 3075F SYST BP GE 130 - 139MM HG: CPT | Performed by: PHYSICIAN ASSISTANT

## 2023-09-26 RX ORDER — ESTRADIOL 0.1 MG/G
CREAM VAGINAL
Qty: 85 G | Refills: 1 | Status: SHIPPED | OUTPATIENT
Start: 2023-09-26 | End: 2023-12-12 | Stop reason: WASHOUT

## 2023-09-26 RX ORDER — ALLOPURINOL 100 MG/1
100 TABLET ORAL EVERY 24 HOURS
COMMUNITY
End: 2023-09-26 | Stop reason: WASHOUT

## 2023-09-26 RX ORDER — NITROFURANTOIN 25; 75 MG/1; MG/1
100 CAPSULE ORAL 2 TIMES DAILY
Qty: 14 CAPSULE | Refills: 0 | Status: SHIPPED | OUTPATIENT
Start: 2023-09-26 | End: 2023-10-03

## 2023-09-26 RX ORDER — LABETALOL 200 MG/1
1 TABLET, FILM COATED ORAL 2 TIMES DAILY
Qty: 180 TABLET | Refills: 1 | Status: SHIPPED | OUTPATIENT
Start: 2023-09-26 | End: 2023-12-29 | Stop reason: SDUPTHER

## 2023-09-26 RX ORDER — CITALOPRAM 10 MG/1
10 TABLET ORAL DAILY
COMMUNITY
End: 2023-09-26 | Stop reason: ALTCHOICE

## 2023-09-26 RX ORDER — OMEPRAZOLE 40 MG/1
40 CAPSULE, DELAYED RELEASE ORAL
COMMUNITY
End: 2023-09-26 | Stop reason: SDUPTHER

## 2023-09-26 RX ORDER — SIMVASTATIN 20 MG/1
20 TABLET, FILM COATED ORAL DAILY
Qty: 90 TABLET | Refills: 1 | Status: SHIPPED | OUTPATIENT
Start: 2023-09-26 | End: 2024-03-05 | Stop reason: SDUPTHER

## 2023-09-26 RX ORDER — ALPRAZOLAM 0.25 MG/1
0.25 TABLET ORAL 2 TIMES DAILY PRN
Qty: 30 TABLET | Refills: 2 | Status: SHIPPED | OUTPATIENT
Start: 2023-09-26 | End: 2023-12-06 | Stop reason: SDUPTHER

## 2023-09-26 RX ORDER — CALCIUM CITRATE/VITAMIN D3 315MG-6.25
1 TABLET ORAL 2 TIMES DAILY
Qty: 180 TABLET | Refills: 1 | Status: SHIPPED | OUTPATIENT
Start: 2023-09-26 | End: 2023-12-12 | Stop reason: WASHOUT

## 2023-09-26 RX ORDER — CITALOPRAM 10 MG/1
10 TABLET ORAL DAILY
Qty: 90 TABLET | Refills: 1 | Status: SHIPPED | OUTPATIENT
Start: 2023-09-26 | End: 2024-03-05 | Stop reason: SDUPTHER

## 2023-09-26 RX ORDER — FOLIC ACID 1 MG/1
1 TABLET ORAL DAILY
Qty: 90 TABLET | Refills: 1 | Status: SHIPPED | OUTPATIENT
Start: 2023-09-26 | End: 2024-03-24

## 2023-09-26 RX ORDER — BENZONATATE 100 MG/1
100 CAPSULE ORAL 3 TIMES DAILY PRN
Qty: 42 CAPSULE | Refills: 0 | Status: SHIPPED | OUTPATIENT
Start: 2023-09-26 | End: 2023-10-26

## 2023-09-26 RX ORDER — METFORMIN HYDROCHLORIDE 500 MG/1
500 TABLET ORAL 2 TIMES DAILY
Qty: 180 TABLET | Refills: 1 | Status: SHIPPED | OUTPATIENT
Start: 2023-09-26 | End: 2024-03-05 | Stop reason: SDUPTHER

## 2023-09-26 RX ORDER — OMEPRAZOLE 40 MG/1
40 CAPSULE, DELAYED RELEASE ORAL
Qty: 90 CAPSULE | Refills: 1 | Status: SHIPPED | OUTPATIENT
Start: 2023-09-26 | End: 2024-03-05 | Stop reason: SDUPTHER

## 2023-09-26 RX ORDER — CYCLOBENZAPRINE HCL 5 MG
5 TABLET ORAL 2 TIMES DAILY PRN
Qty: 30 TABLET | Refills: 0 | Status: SHIPPED | OUTPATIENT
Start: 2023-09-26 | End: 2023-10-11

## 2023-09-26 RX ORDER — LEVOTHYROXINE SODIUM 50 UG/1
50 TABLET ORAL
Qty: 90 TABLET | Refills: 1 | Status: SHIPPED | OUTPATIENT
Start: 2023-09-26 | End: 2023-12-12 | Stop reason: SDUPTHER

## 2023-09-26 RX ORDER — ALBUTEROL SULFATE 0.83 MG/ML
2.5 SOLUTION RESPIRATORY (INHALATION) EVERY 8 HOURS PRN
Qty: 270 ML | Refills: 1 | Status: SHIPPED | OUTPATIENT
Start: 2023-09-26 | End: 2024-03-05 | Stop reason: SDUPTHER

## 2023-09-26 NOTE — PATIENT INSTRUCTIONS
Antibiotic sent for UTI. Culture pending. Concern for recurrent UTIs recently. Please call urology for appointment      Please get your xrays done of your chest and R hip. Call our office next week if your R groin pain is not improved.      Please schedule your colonoscopy.

## 2023-09-26 NOTE — PROGRESS NOTES
Subjective     HPI   Linsey Donis is a 83 y.o. year old female patient with presenting to clinic with concern for   Chief Complaint   Patient presents with    Follow-up    UTI     Pressure, burning. Has been coughing a lot. Better now.       Has felt urinary discomfort for a few days. Concern for recurrent UTIs, Has been on keflex, cipro and macrobid this summer. Referred to urology.    Has ongoing cough since covid. Dry, no chest pain. No hemoptysis. Nonproductive. Ongoing coughing is making R inguinal area pain. Pain with ambulation.  NKI.    Med list reviewed- Celexa    Xanax refill- bid    Labs 4/18/23 done  Margarita 4/18/23  Dexa  4/18/23   osteopenia  GI Dr paige- colonoscopy 5/23/23 hx TA -  Repeat colonosc sched. 8/31/23    OARRS:  No data recorded  I have personally reviewed the OARRS report for Linsey Donis. I have considered the risks of abuse, dependence, addiction and diversion    Is the patient prescribed a combination of a benzodiazepine and opioid?  No    Last Urine Drug Screen / ordered today: Yes  Recent Results (from the past 8760 hour(s))   OPIATE/OPIOID/BENZO PRESCRIPTION COMPLIANCE    Collection Time: 07/27/23 12:27 PM   Result Value Ref Range    DRUG SCREEN COMMENT URINE SEE BELOW     Creatine, Urine 207.1 mg/dL    Amphetamine Screen, Urine PRESUMPTIVE NEGATIVE NEGATIVE    Barbiturate Screen, Urine PRESUMPTIVE NEGATIVE NEGATIVE    Cannabinoid Screen, Urine PRESUMPTIVE NEGATIVE NEGATIVE    Cocaine Screen, Urine PRESUMPTIVE NEGATIVE NEGATIVE    PCP Screen, Urine PRESUMPTIVE NEGATIVE NEGATIVE    7-Aminoclonazepam <25 Cutoff <25 ng/mL    Alpha-Hydroxyalprazolam 165 (A) Cutoff <25 ng/mL    Alpha-Hydroxymidazolam <25 Cutoff <25 ng/mL    Alprazolam 113 (A) Cutoff <25 ng/mL    Chlordiazepoxide <25 Cutoff <25 ng/mL    Clonazepam <25 Cutoff <25 ng/mL    Diazepam <25 Cutoff <25 ng/mL    Lorazepam <25 Cutoff <25 ng/mL    Midazolam <25 Cutoff <25 ng/mL    Nordiazepam <25 Cutoff <25 ng/mL     Oxazepam <25 Cutoff <25 ng/mL    Temazepam <25 Cutoff <25 ng/mL    Zolpidem <25 Cutoff <25 ng/mL    Zolpidem Metabolite (ZCA) <25 Cutoff <25 ng/mL    6-Acetylmorphine <25 Cutoff <25 ng/mL    Codeine <50 Cutoff <50 ng/mL    Hydrocodone <25 Cutoff <25 ng/mL    Hydromorphone <25 Cutoff <25 ng/mL    Morphine Urine <50 Cutoff <50 ng/mL    Norhydrocodone <25 Cutoff <25 ng/mL    Noroxycodone <25 Cutoff <25 ng/mL    Oxycodone <25 Cutoff <25 ng/mL    Oxymorphone <25 Cutoff <25 ng/mL    Tramadol <50 Cutoff <50 ng/mL    O-Desmethyltramadol <50 Cutoff <50 ng/mL    Fentanyl <2.5 Cutoff<2.5 ng/mL    Norfentanyl <2.5 Cutoff<2.5 ng/mL    METHADONE CONFIRMATION,URINE <25 Cutoff <25 ng/mL    EDDP <25 Cutoff <25 ng/mL     Results are as expected.         Controlled Substance Agreement:  Date of the Last Agreement: 6/28/23  Reviewed Controlled Substance Agreement including but not limited to the benefits, risks, and alternatives to treatment with a Controlled Substance medication(s).    Benzodiazepines:  What is the patient's goal of therapy? Anxiety control  Is this being achieved with current treatment? yes    AVA-7:  No data recorded    Activities of Daily Living:   Is your overall impression that this patient is benefiting (symptom reduction outweighs side effects) from benzodiazepine therapy? Yes     1. Physical Functioning: Same  2. Family Relationship: Better  3. Social Relationship: Better  4. Mood: Better  5. Sleep Patterns: Better  6. Overall Function: Better    Patient Active Problem List   Diagnosis    Allergic rhinitis    Anxiety disorder    Chronic fatigue    Chronic neck pain    Disorder of jaw    Dizziness    DM2 (diabetes mellitus, type 2) (CMS/HCC)    Dyslipidemia    Adult hypothyroidism    Enlarged thyroid    GERD (gastroesophageal reflux disease)    Gout    Hearing loss    HTN (hypertension)    Insomnia    Polyarthralgia    Vitamin D deficiency    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HCC)    Sjogren's syndrome with  keratoconjunctivitis sicca (CMS/HCC)    Osteomyelitis (CMS/HCC)    Simple chronic bronchitis (CMS/HCC)    Deep vein thrombosis (DVT) of other vein of lower extremity, unspecified chronicity, unspecified laterality (CMS/HCC)    Cervical radiculopathy    DDD (degenerative disc disease), lumbosacral    Edema of both legs    History of stroke    Knee pain, bilateral    Obstructive sleep apnea syndrome    Postlaminectomy syndrome, lumbar region    Temporomandibular joint pain dysfunction syndrome    Stroke (CMS/[facility])    Urine test positive for microalbuminuria    Chronic pain syndrome    Sjogren's syndrome (CMS/HCC)    Mixed simple and mucopurulent chronic bronchitis (CMS/[facility])    Controlled substance agreement signed       Past Medical History:   Diagnosis Date    Acute bilateral low back pain with right-sided sciatica 06/15/2018    COPD with exacerbation (CMS/HCC) 02/15/2018    Esophagitis 02/12/2014    History of cervical spinal surgery 01/22/2018    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    History of lumbar surgery 01/22/2018    Left sided sciatica 12/02/2014    Other conditions influencing health status     Stroke syndrome    Otitis media, unspecified, bilateral 04/12/2022    Acute bilateral otitis media    Pain in right knee 11/10/2022    Acute pain of right knee    Personal history of other diseases of the digestive system     History of diverticulitis of colon    Personal history of other diseases of the digestive system     History of glossitis    Personal history of other diseases of the musculoskeletal system and connective tissue     History of fibromyositis    Personal history of other diseases of the musculoskeletal system and connective tissue     History of fibromyositis    Personal history of other diseases of the nervous system and sense organs 04/15/2022    History of otitis media    Simple chronic bronchitis (CMS/HCC) 02/15/2018    Sjogren syndrome, unspecified (CMS/[facility])     Sjogrens syndrome     "Unspecified otitis externa, unspecified ear 04/15/2022    Otitis externa      Past Surgical History:   Procedure Laterality Date    CATARACT EXTRACTION  11/15/2012    Cataract Surgery    CATARACT EXTRACTION  2013    Cataract Surgery    COLECTOMY      8\" colon removed    HYSTERECTOMY  11/15/2012    Hysterectomy    HYSTERECTOMY  2013    Hysterectomy    MR HEAD ANGIO WO IV CONTRAST  2023    MR HEAD ANGIO WO IV CONTRAST 2023 GEN MRI    MR NECK ANGIO WO IV CONTRAST  2023    MR NECK ANGIO WO IV CONTRAST 2023 GEN MRI    OTHER SURGICAL HISTORY  2013    Fusion / Refusion Of Vertebrae      Family History   Problem Relation Name Age of Onset    No Known Problems Mother      No Known Problems Father        Social History     Tobacco Use    Smoking status: Former     Packs/day: 1.00     Years: 20.00     Additional pack years: 0.00     Total pack years: 20.00     Types: Cigarettes     Start date:      Quit date:      Years since quittin.7    Smokeless tobacco: Never   Substance Use Topics    Alcohol use: Never        Current Outpatient Medications:     DULoxetine (Cymbalta) 30 mg DR capsule, Take 1 capsule (30 mg) by mouth 2 times a day. Do not crush or chew., Disp: 60 capsule, Rfl: 5    estradiol (Estrace) 0.01 % (0.1 mg/gram) vaginal cream, Apply pea sized amount into vagina nightly for 1 week then every Monday/Wednesday/Friday., Disp: 85 g, Rfl: 0    folic acid (Folvite) 1 mg tablet, Take 1 tablet (1 mg) by mouth once daily., Disp: 90 tablet, Rfl: 1    labetalol (Normodyne) 200 mg tablet, Take 1 tablet (200 mg) by mouth 2 times a day., Disp: 180 tablet, Rfl: 1    latanoprost (Xalatan) 0.005 % ophthalmic solution, Latanoprost 0.005 % Ophthalmic Solution  Quantity: 8  Refills: 0      Start : 24-May-2021  Active, Disp: , Rfl:     levothyroxine (Synthroid) 50 mcg tablet, Take 1 tablet (50 mcg) by mouth once daily., Disp: 90 tablet, Rfl: 0    meclizine (Antivert) 25 mg tablet, " Take 1 tablet (25 mg) by mouth 2 times a day as needed for dizziness for up to 30 doses., Disp: 30 tablet, Rfl: 0    metFORMIN (Glucophage) 500 mg tablet, Take 1 tablet (500 mg) by mouth 2 times a day., Disp: 180 tablet, Rfl: 1    omeprazole (PriLOSEC) 20 mg DR capsule, Take 1 capsule (20 mg) by mouth once daily in the morning. Take before meals. Do not crush or chew., Disp: 90 capsule, Rfl: 1    oxygen (O2) therapy, Inhale 3 L/min at 180,000 mL/hr once daily at bedtime. via nasal canula, Disp: , Rfl:     predniSONE (Deltasone) 20 mg tablet, Take 3 tabs (60mg) daily for 3 days, then take 2 tabs (40mg) daily for 3 days, then take 1 tab (20mg) daily for 3 days., Disp: 18 tablet, Rfl: 0    simvastatin (Zocor) 20 mg tablet, Take 1 tablet (20 mg) by mouth once daily., Disp: 90 tablet, Rfl: 1    albuterol 2.5 mg /3 mL (0.083 %) nebulizer solution, USE 1 UNIT VIA NEBULIZER FOUR TIMES DAILY, Disp: , Rfl:     ALPRAZolam (Xanax) 0.25 mg tablet, Take 1 tablet (0.25 mg) by mouth 2 times a day as needed for anxiety., Disp: 60 tablet, Rfl: 0    ALPRAZolam (Xanax) 0.25 mg tablet, Take 1 tablet (0.25 mg) by mouth 2 times a day as needed for anxiety. Do not start before August 27, 2023., Disp: 60 tablet, Rfl: 0    ALPRAZolam (Xanax) 0.25 mg tablet, Take 1 tablet (0.25 mg) by mouth 2 times a day as needed for anxiety., Disp: 60 tablet, Rfl: 0    aspirin 81 mg EC tablet, Take 1 tablet (81 mg) by mouth once daily., Disp: 90 tablet, Rfl: 1    calcium citrate-vitamin D3 (Citracal + D Maximum) 315 mg-6.25 mcg (250 unit) tablet, Take 1 tablet (315 mg) by mouth 2 times a day., Disp: 180 tablet, Rfl: 1     Review of Systems  Constitutional: Denies fever  HEENT: Denies ST, earache  CVS: Denies Chest pain  Pulmonary: Denies wheezing, SOB  GI: Denies N/V  : Denies dysuria  Musculoskeletal:  Denies myalgia  Neuro: Denies focal weakness or numbness.  Skin: Denies Rashes.  *Review of Systems is negative unless otherwise mentioned in HPI or ROS  "above.    Objective   Temp 37 °C (98.6 °F)   Ht 1.575 m (5' 2\")   Wt 72.3 kg (159 lb 6.4 oz)   BMI 29.15 kg/m²  reviewed Body mass index is 29.15 kg/m².     Physical Exam  Constitutional: NAD.  Resting comfortably.  Head: Atraumatic, normocephalic.  ENT: Moist oral mucosa. Nasal mucosa wnl.   Cardiac: Regular rate & rhythm.   Pulmonary: Lungs clear bilat.  GI: Soft, Nontender, nondistended. No palpable inguinal/femoral hernia. Examined sitting and standing.   Musculoskeletal: No peripheral edema.   Skin: No evidence of trauma. No rashes  Psych: Intact judgement and insight.    .Assessment/Plan   Problem List Items Addressed This Visit             ICD-10-CM    Controlled substance agreement signed Z79.899    Relevant Orders    Opiate/Opioid/Benzo Extended Prescription Compliance     Other Visit Diagnoses         Codes    Urinary tract infection with hematuria, site unspecified    -  Primary N39.0, R31.9    Relevant Orders    POCT UA Automated manually resulted (Completed)    Urine Culture    Recurrent UTI     N39.0    Relevant Orders    Referral to Urology    Subacute cough     R05.2    Relevant Orders    XR chest 2 views    Right inguinal pain     R10.31    Relevant Orders    XR hip right 2 or 3 views    Anxiety     F41.9    Relevant Medications    ALPRAZolam (Xanax) 0.25 mg tablet    citalopram (CeleXA) 10 mg tablet    Polyp of colon, unspecified part of colon, unspecified type     K63.5    Relevant Orders    Colonoscopy Screening            "

## 2023-09-27 ENCOUNTER — APPOINTMENT (OUTPATIENT)
Dept: PRIMARY CARE | Facility: CLINIC | Age: 83
End: 2023-09-27
Payer: MEDICARE

## 2023-09-27 LAB — URINE CULTURE: NORMAL

## 2023-10-03 ENCOUNTER — TELEPHONE (OUTPATIENT)
Dept: PRIMARY CARE | Facility: CLINIC | Age: 83
End: 2023-10-03
Payer: MEDICARE

## 2023-10-03 DIAGNOSIS — I77.1 TORTUOUS AORTA (CMS-HCC): Primary | ICD-10-CM

## 2023-10-03 NOTE — TELEPHONE ENCOUNTER
left message with her  to have her call me when she is back from her dentist appointment to discuss results.

## 2023-10-04 DIAGNOSIS — I77.1 TORTUOUS AORTA (CMS-HCC): Primary | ICD-10-CM

## 2023-10-04 DIAGNOSIS — I10 HYPERTENSION WITH NORMAL RENAL FUNCTION: Primary | ICD-10-CM

## 2023-10-05 ENCOUNTER — HOSPITAL ENCOUNTER (OUTPATIENT)
Dept: RADIOLOGY | Facility: HOSPITAL | Age: 83
Discharge: HOME | End: 2023-10-05
Payer: MEDICARE

## 2023-10-05 ENCOUNTER — LAB (OUTPATIENT)
Dept: LAB | Facility: LAB | Age: 83
End: 2023-10-05
Payer: MEDICARE

## 2023-10-05 ENCOUNTER — APPOINTMENT (OUTPATIENT)
Dept: RADIOLOGY | Facility: HOSPITAL | Age: 83
End: 2023-10-05
Payer: MEDICARE

## 2023-10-05 DIAGNOSIS — I10 HYPERTENSION WITH NORMAL RENAL FUNCTION: ICD-10-CM

## 2023-10-05 DIAGNOSIS — I77.1 TORTUOUS AORTA (CMS-HCC): ICD-10-CM

## 2023-10-05 LAB
CREAT SERPL-MCNC: 0.66 MG/DL (ref 0.5–1.05)
GFR SERPL CREATININE-BSD FRML MDRD: 88 ML/MIN/1.73M*2

## 2023-10-05 PROCEDURE — 74177 CT ABD & PELVIS W/CONTRAST: CPT | Performed by: RADIOLOGY

## 2023-10-05 PROCEDURE — 74174 CTA ABD&PLVS W/CONTRAST: CPT

## 2023-10-05 PROCEDURE — 71260 CT THORAX DX C+: CPT | Performed by: RADIOLOGY

## 2023-10-05 PROCEDURE — 36415 COLL VENOUS BLD VENIPUNCTURE: CPT

## 2023-10-05 PROCEDURE — 82565 ASSAY OF CREATININE: CPT

## 2023-10-11 ENCOUNTER — OFFICE VISIT (OUTPATIENT)
Dept: SURGERY | Facility: CLINIC | Age: 83
End: 2023-10-11
Payer: MEDICARE

## 2023-10-11 VITALS
DIASTOLIC BLOOD PRESSURE: 66 MMHG | TEMPERATURE: 97.4 F | HEART RATE: 50 BPM | BODY MASS INDEX: 29.63 KG/M2 | WEIGHT: 161 LBS | HEIGHT: 62 IN | SYSTOLIC BLOOD PRESSURE: 132 MMHG

## 2023-10-11 DIAGNOSIS — K59.09 OTHER CONSTIPATION: ICD-10-CM

## 2023-10-11 DIAGNOSIS — Z12.11 SCREENING FOR COLON CANCER: Primary | ICD-10-CM

## 2023-10-11 PROBLEM — K59.00 CONSTIPATION: Status: ACTIVE | Noted: 2023-10-11

## 2023-10-11 PROCEDURE — 99202 OFFICE O/P NEW SF 15 MIN: CPT | Performed by: SURGERY

## 2023-10-11 PROCEDURE — 1036F TOBACCO NON-USER: CPT | Performed by: SURGERY

## 2023-10-11 PROCEDURE — 1160F RVW MEDS BY RX/DR IN RCRD: CPT | Performed by: SURGERY

## 2023-10-11 PROCEDURE — 3078F DIAST BP <80 MM HG: CPT | Performed by: SURGERY

## 2023-10-11 PROCEDURE — 1126F AMNT PAIN NOTED NONE PRSNT: CPT | Performed by: SURGERY

## 2023-10-11 PROCEDURE — 3075F SYST BP GE 130 - 139MM HG: CPT | Performed by: SURGERY

## 2023-10-11 PROCEDURE — 1159F MED LIST DOCD IN RCRD: CPT | Performed by: SURGERY

## 2023-10-11 NOTE — PROGRESS NOTES
Subjective   Patient ID: Linsey Donis is a 83 y.o. female.    HPI this patient is here to discuss a colonoscopy.  She had a previous - 1/1/2014.  She has a previous left sigmoid resection.  There were no abnormalities noted.    Review of Systems   All other systems reviewed and are negative.      Objective   Physical Exam  Constitutional:       Appearance: Normal appearance.   Abdominal:      Palpations: Abdomen is soft. There is no mass.      Tenderness: There is no abdominal tenderness. There is no guarding.      Hernia: No hernia is present.         Assessment/Plan   Diagnoses and all orders for this visit:  Screening for colon cancer  Other constipation      Plan-Future colonoscopy done 1/20/2014.  This was negative.  He had a previous sigmoid colectomy performed almost 10 years ago.  This explains your intermittent diarrhea and constipation.  You do not need another colonoscopy per the guidelines.

## 2023-10-11 NOTE — PATIENT INSTRUCTIONS
You do not need to have another colonoscopy.  You had 1 9 years ago.  This was normal.  You do have part of her colon removed.  This explains why you are having intermittent diarrhea and constipation.  I will continue a high-fiber diet.  I would also try to take 25 g of protein with each meal.  You can follow-up with me as needed

## 2023-10-12 ENCOUNTER — ANCILLARY PROCEDURE (OUTPATIENT)
Dept: RADIOLOGY | Facility: CLINIC | Age: 83
End: 2023-10-12
Payer: MEDICARE

## 2023-10-12 ENCOUNTER — LAB (OUTPATIENT)
Dept: LAB | Facility: LAB | Age: 83
End: 2023-10-12
Payer: MEDICARE

## 2023-10-12 DIAGNOSIS — Z87.442 PERSONAL HISTORY OF URINARY CALCULI: ICD-10-CM

## 2023-10-12 DIAGNOSIS — N39.0 URINARY TRACT INFECTION, SITE NOT SPECIFIED: Primary | ICD-10-CM

## 2023-10-12 PROCEDURE — 74018 RADEX ABDOMEN 1 VIEW: CPT | Performed by: RADIOLOGY

## 2023-10-12 PROCEDURE — 74018 RADEX ABDOMEN 1 VIEW: CPT | Mod: FY

## 2023-10-12 PROCEDURE — 87086 URINE CULTURE/COLONY COUNT: CPT

## 2023-10-13 LAB — BACTERIA UR CULT: NORMAL

## 2023-10-18 ENCOUNTER — HOSPITAL ENCOUNTER (OUTPATIENT)
Dept: RADIOLOGY | Facility: HOSPITAL | Age: 83
Discharge: HOME | End: 2023-10-18
Payer: MEDICARE

## 2023-10-18 DIAGNOSIS — Z87.442 PERSONAL HISTORY OF URINARY CALCULI: ICD-10-CM

## 2023-10-18 PROCEDURE — 76770 US EXAM ABDO BACK WALL COMP: CPT | Performed by: RADIOLOGY

## 2023-10-18 PROCEDURE — 76770 US EXAM ABDO BACK WALL COMP: CPT

## 2023-11-17 DIAGNOSIS — F41.9 ANXIETY: ICD-10-CM

## 2023-11-17 RX ORDER — ALPRAZOLAM 0.25 MG/1
0.25 TABLET ORAL 2 TIMES DAILY PRN
Qty: 30 TABLET | Refills: 2 | OUTPATIENT
Start: 2023-11-17 | End: 2024-02-15

## 2023-12-04 ENCOUNTER — HOSPITAL ENCOUNTER (EMERGENCY)
Facility: HOSPITAL | Age: 83
Discharge: HOME | End: 2023-12-04
Attending: EMERGENCY MEDICINE
Payer: MEDICARE

## 2023-12-04 VITALS
BODY MASS INDEX: 29.44 KG/M2 | RESPIRATION RATE: 18 BRPM | WEIGHT: 160 LBS | HEIGHT: 62 IN | SYSTOLIC BLOOD PRESSURE: 158 MMHG | TEMPERATURE: 98.1 F | OXYGEN SATURATION: 95 % | HEART RATE: 54 BPM | DIASTOLIC BLOOD PRESSURE: 66 MMHG

## 2023-12-04 DIAGNOSIS — M79.10 MUSCLE ACHE: Primary | ICD-10-CM

## 2023-12-04 PROCEDURE — 99283 EMERGENCY DEPT VISIT LOW MDM: CPT | Mod: 25

## 2023-12-04 PROCEDURE — 2500000004 HC RX 250 GENERAL PHARMACY W/ HCPCS (ALT 636 FOR OP/ED): Performed by: EMERGENCY MEDICINE

## 2023-12-04 PROCEDURE — 2500000001 HC RX 250 WO HCPCS SELF ADMINISTERED DRUGS (ALT 637 FOR MEDICARE OP): Performed by: EMERGENCY MEDICINE

## 2023-12-04 PROCEDURE — 94760 N-INVAS EAR/PLS OXIMETRY 1: CPT

## 2023-12-04 PROCEDURE — 96372 THER/PROPH/DIAG INJ SC/IM: CPT

## 2023-12-04 PROCEDURE — 99284 EMERGENCY DEPT VISIT MOD MDM: CPT | Performed by: EMERGENCY MEDICINE

## 2023-12-04 RX ORDER — KETOROLAC TROMETHAMINE 15 MG/ML
15 INJECTION, SOLUTION INTRAMUSCULAR; INTRAVENOUS ONCE
Status: COMPLETED | OUTPATIENT
Start: 2023-12-04 | End: 2023-12-04

## 2023-12-04 RX ORDER — DEXAMETHASONE 4 MG/1
TABLET ORAL
Status: DISCONTINUED
Start: 2023-12-04 | End: 2023-12-04 | Stop reason: HOSPADM

## 2023-12-04 RX ORDER — KETOROLAC TROMETHAMINE 15 MG/ML
INJECTION, SOLUTION INTRAMUSCULAR; INTRAVENOUS
Status: DISCONTINUED
Start: 2023-12-04 | End: 2023-12-04 | Stop reason: HOSPADM

## 2023-12-04 RX ORDER — DEXAMETHASONE SODIUM PHOSPHATE 4 MG/ML
10 INJECTION, SOLUTION INTRA-ARTICULAR; INTRALESIONAL; INTRAMUSCULAR; INTRAVENOUS; SOFT TISSUE ONCE
Status: DISCONTINUED | OUTPATIENT
Start: 2023-12-04 | End: 2023-12-04 | Stop reason: WASHOUT

## 2023-12-04 RX ORDER — HYDROCODONE BITARTRATE AND ACETAMINOPHEN 5; 325 MG/1; MG/1
1 TABLET ORAL ONCE
Status: COMPLETED | OUTPATIENT
Start: 2023-12-04 | End: 2023-12-04

## 2023-12-04 RX ORDER — HYDROCODONE BITARTRATE AND ACETAMINOPHEN 5; 325 MG/1; MG/1
TABLET ORAL
Status: DISCONTINUED
Start: 2023-12-04 | End: 2023-12-04 | Stop reason: HOSPADM

## 2023-12-04 RX ORDER — DEXAMETHASONE 6 MG/1
TABLET ORAL
Status: DISCONTINUED
Start: 2023-12-04 | End: 2023-12-04 | Stop reason: HOSPADM

## 2023-12-04 RX ORDER — NAPROXEN 375 MG/1
375 TABLET ORAL
Qty: 6 TABLET | Refills: 0 | Status: SHIPPED | OUTPATIENT
Start: 2023-12-04 | End: 2023-12-12

## 2023-12-04 RX ADMIN — Medication 10 MG: at 11:00

## 2023-12-04 RX ADMIN — HYDROCODONE BITARTRATE AND ACETAMINOPHEN 1 TABLET: 5; 325 TABLET ORAL at 11:12

## 2023-12-04 RX ADMIN — KETOROLAC TROMETHAMINE 15 MG: 15 INJECTION, SOLUTION INTRAMUSCULAR; INTRAVENOUS at 11:09

## 2023-12-04 ASSESSMENT — PAIN SCALES - GENERAL
PAINLEVEL_OUTOF10: 3
PAINLEVEL_OUTOF10: 10 - WORST POSSIBLE PAIN
PAINLEVEL_OUTOF10: 10 - WORST POSSIBLE PAIN

## 2023-12-04 ASSESSMENT — COLUMBIA-SUICIDE SEVERITY RATING SCALE - C-SSRS
6. HAVE YOU EVER DONE ANYTHING, STARTED TO DO ANYTHING, OR PREPARED TO DO ANYTHING TO END YOUR LIFE?: NO
1. IN THE PAST MONTH, HAVE YOU WISHED YOU WERE DEAD OR WISHED YOU COULD GO TO SLEEP AND NOT WAKE UP?: NO
2. HAVE YOU ACTUALLY HAD ANY THOUGHTS OF KILLING YOURSELF?: NO

## 2023-12-04 ASSESSMENT — PAIN - FUNCTIONAL ASSESSMENT
PAIN_FUNCTIONAL_ASSESSMENT: 0-10

## 2023-12-04 ASSESSMENT — PAIN DESCRIPTION - FREQUENCY: FREQUENCY: CONSTANT/CONTINUOUS

## 2023-12-04 ASSESSMENT — PAIN DESCRIPTION - PROGRESSION: CLINICAL_PROGRESSION: GRADUALLY IMPROVING

## 2023-12-04 ASSESSMENT — PAIN DESCRIPTION - ONSET: ONSET: ONGOING

## 2023-12-04 ASSESSMENT — PAIN DESCRIPTION - LOCATION
LOCATION: NECK
LOCATION: GENERALIZED

## 2023-12-04 NOTE — ED PROVIDER NOTES
"HPI   Chief Complaint   Patient presents with    Generalized Body Aches     Generalized body ache a few weeks, 10 on scale       HPI                    Naima Coma Scale Score: 15                  Patient History   Past Medical History:   Diagnosis Date    Acute bilateral low back pain with right-sided sciatica 06/15/2018    COPD with exacerbation (CMS/HCC) 02/15/2018    Esophagitis 02/12/2014    History of cervical spinal surgery 01/22/2018    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    History of lumbar surgery 01/22/2018    Left sided sciatica 12/02/2014    Other conditions influencing health status     Stroke syndrome    Otitis media, unspecified, bilateral 04/12/2022    Acute bilateral otitis media    Pain in right knee 11/10/2022    Acute pain of right knee    Personal history of other diseases of the digestive system     History of diverticulitis of colon    Personal history of other diseases of the digestive system     History of glossitis    Personal history of other diseases of the musculoskeletal system and connective tissue     History of fibromyositis    Personal history of other diseases of the musculoskeletal system and connective tissue     History of fibromyositis    Personal history of other diseases of the nervous system and sense organs 04/15/2022    History of otitis media    Simple chronic bronchitis (CMS/[facility]) 02/15/2018    Sjogren syndrome, unspecified (CMS/[facility])     Sjogrens syndrome    Unspecified otitis externa, unspecified ear 04/15/2022    Otitis externa     Past Surgical History:   Procedure Laterality Date    CATARACT EXTRACTION  11/15/2012    Cataract Surgery    CATARACT EXTRACTION  04/08/2013    Cataract Surgery    COLECTOMY      8\" colon removed    HYSTERECTOMY  11/15/2012    Hysterectomy    HYSTERECTOMY  04/08/2013    Hysterectomy    MR HEAD ANGIO WO IV CONTRAST  06/19/2023    MR HEAD ANGIO WO IV CONTRAST 6/19/2023 GEN MRI    MR NECK ANGIO WO IV CONTRAST  06/19/2023    MR NECK " ANGIO WO IV CONTRAST 2023 GEN MRI    OTHER SURGICAL HISTORY  2013    Fusion / Refusion Of Vertebrae     Family History   Problem Relation Name Age of Onset    No Known Problems Mother      No Known Problems Father       Social History     Tobacco Use    Smoking status: Former     Packs/day: 1.00     Years: 20.00     Additional pack years: 0.00     Total pack years: 20.00     Types: Cigarettes     Start date:      Quit date:      Years since quittin.9    Smokeless tobacco: Never   Vaping Use    Vaping Use: Never used   Substance Use Topics    Alcohol use: Never    Drug use: Never       Physical Exam   ED Triage Vitals   Temp Heart Rate Resp BP   23 0923 0913 23 09   36.7 °C (98.1 °F) 54 18 152/72      SpO2 Temp src Heart Rate Source Patient Position   23 09 -- 23 1120 23 1120   98 %  Monitor Lying      BP Location FiO2 (%)     -- --             Physical Exam  Vitals and nursing note reviewed.   Constitutional:       General: She is not in acute distress.     Appearance: She is well-developed.   HENT:      Head: Normocephalic and atraumatic.   Eyes:      Conjunctiva/sclera: Conjunctivae normal.   Cardiovascular:      Rate and Rhythm: Normal rate and regular rhythm.      Heart sounds: No murmur heard.  Pulmonary:      Effort: Pulmonary effort is normal. No respiratory distress.      Breath sounds: Normal breath sounds.   Abdominal:      Palpations: Abdomen is soft.      Tenderness: There is no abdominal tenderness.   Musculoskeletal:         General: No swelling.      Cervical back: Neck supple.   Skin:     General: Skin is warm and dry.      Capillary Refill: Capillary refill takes less than 2 seconds.   Neurological:      Mental Status: She is alert.   Psychiatric:         Mood and Affect: Mood normal.         ED Course & MDM   Diagnoses as of 23 1241   Muscle ache       Medical Decision Making  83-year-old female presents to the  ER with chief complaint of achy right shoulder achy neck patient reports has been going on for a long time but got worse because she had to go to different  and had to get up and down at the charge and she thinks this aggravated her pain.  Patient also complains of bilateral wrist pain also.  Patient denies any other pain patient was given some pain occasion and one-time dose here in the ED.  Patient reports that she feels markedly improved and much better.  Patient be discharged home to follow-up with her PCP as needed.    Pain is worse with movement better with rest        Procedure  Procedures     Flavio Rodriguez, DO  23 1241

## 2023-12-04 NOTE — ED TRIAGE NOTES
Patient presents with generalized body ache, thinks she has overdone things during recent activity change

## 2023-12-06 DIAGNOSIS — F41.9 ANXIETY: ICD-10-CM

## 2023-12-07 RX ORDER — ALPRAZOLAM 0.25 MG/1
0.25 TABLET ORAL 2 TIMES DAILY PRN
Qty: 30 TABLET | Refills: 2 | Status: SHIPPED | OUTPATIENT
Start: 2023-12-07 | End: 2023-12-12 | Stop reason: SDUPTHER

## 2023-12-12 ENCOUNTER — OFFICE VISIT (OUTPATIENT)
Dept: PRIMARY CARE | Facility: CLINIC | Age: 83
End: 2023-12-12
Payer: MEDICARE

## 2023-12-12 VITALS
WEIGHT: 163 LBS | DIASTOLIC BLOOD PRESSURE: 50 MMHG | SYSTOLIC BLOOD PRESSURE: 110 MMHG | TEMPERATURE: 98.6 F | OXYGEN SATURATION: 97 % | HEART RATE: 62 BPM | HEIGHT: 62 IN | BODY MASS INDEX: 30 KG/M2

## 2023-12-12 DIAGNOSIS — R60.0 PEDAL EDEMA: ICD-10-CM

## 2023-12-12 DIAGNOSIS — E03.9 ADULT HYPOTHYROIDISM: ICD-10-CM

## 2023-12-12 DIAGNOSIS — F41.9 ANXIETY: ICD-10-CM

## 2023-12-12 DIAGNOSIS — I11.0 HYPERTENSIVE HEART DISEASE WITH HEART FAILURE (MULTI): ICD-10-CM

## 2023-12-12 DIAGNOSIS — M79.18 MYALGIA, MULTIPLE SITES: Primary | ICD-10-CM

## 2023-12-12 PROCEDURE — 1126F AMNT PAIN NOTED NONE PRSNT: CPT | Performed by: PHYSICIAN ASSISTANT

## 2023-12-12 PROCEDURE — 3074F SYST BP LT 130 MM HG: CPT | Performed by: PHYSICIAN ASSISTANT

## 2023-12-12 PROCEDURE — 3078F DIAST BP <80 MM HG: CPT | Performed by: PHYSICIAN ASSISTANT

## 2023-12-12 PROCEDURE — 1160F RVW MEDS BY RX/DR IN RCRD: CPT | Performed by: PHYSICIAN ASSISTANT

## 2023-12-12 PROCEDURE — 99214 OFFICE O/P EST MOD 30 MIN: CPT | Performed by: PHYSICIAN ASSISTANT

## 2023-12-12 PROCEDURE — 1159F MED LIST DOCD IN RCRD: CPT | Performed by: PHYSICIAN ASSISTANT

## 2023-12-12 PROCEDURE — 1036F TOBACCO NON-USER: CPT | Performed by: PHYSICIAN ASSISTANT

## 2023-12-12 RX ORDER — LEVOTHYROXINE SODIUM 50 UG/1
50 TABLET ORAL
Qty: 90 TABLET | Refills: 1 | Status: SHIPPED | OUTPATIENT
Start: 2023-12-12 | End: 2024-03-05 | Stop reason: SDUPTHER

## 2023-12-12 RX ORDER — ALPRAZOLAM 0.25 MG/1
0.25 TABLET ORAL 2 TIMES DAILY PRN
Qty: 30 TABLET | Refills: 2 | Status: SHIPPED | OUTPATIENT
Start: 2023-12-12 | End: 2024-02-20 | Stop reason: SDUPTHER

## 2023-12-12 RX ORDER — TIZANIDINE 2 MG/1
2 TABLET ORAL EVERY 6 HOURS PRN
Qty: 30 TABLET | Refills: 0 | Status: SHIPPED | OUTPATIENT
Start: 2023-12-12 | End: 2024-03-05 | Stop reason: SDUPTHER

## 2023-12-12 RX ORDER — ALPRAZOLAM 0.25 MG/1
0.25 TABLET ORAL 2 TIMES DAILY PRN
Qty: 30 TABLET | Refills: 2 | Status: SHIPPED | OUTPATIENT
Start: 2023-12-12 | End: 2023-12-12 | Stop reason: ENTERED-IN-ERROR

## 2023-12-12 RX ORDER — IBUPROFEN 400 MG/1
400 TABLET ORAL EVERY 8 HOURS PRN
Qty: 60 TABLET | Refills: 0 | Status: SHIPPED | OUTPATIENT
Start: 2023-12-12 | End: 2024-03-05 | Stop reason: SDUPTHER

## 2023-12-12 ASSESSMENT — PATIENT HEALTH QUESTIONNAIRE - PHQ9
1. LITTLE INTEREST OR PLEASURE IN DOING THINGS: NOT AT ALL
2. FEELING DOWN, DEPRESSED OR HOPELESS: NOT AT ALL
SUM OF ALL RESPONSES TO PHQ9 QUESTIONS 1 AND 2: 0

## 2023-12-12 NOTE — PROGRESS NOTES
Subjective     HPI   Linsey Donis is a 83 y.o. year old female patient with presenting to clinic with concern for   Chief Complaint   Patient presents with    Follow-up     3 mth. Was in the ER on the 4th. Muscle aches/pain. Has been taking ibuprofen 800 mg from . Was given naproxen in ER concerned about the side effects. Would like to discuss.        Worried about NSAIDs d/t heart issues after reading medication insert. Discussed at length. She would need to use sparingly and at attenuated dose at her age.    Muscle aches over the past week. Was sitting and standing a lot for son in law's . Had to sit in back seat of car and has felt crummy since.     Patient Active Problem List   Diagnosis    Allergic rhinitis    Anxiety disorder    Chronic fatigue    Chronic neck pain    Disorder of jaw    Dizziness    DM2 (diabetes mellitus, type 2) (CMS/HCC)    Dyslipidemia    Adult hypothyroidism    Enlarged thyroid    GERD (gastroesophageal reflux disease)    Gout    Hearing loss    HTN (hypertension)    Insomnia    Polyarthralgia    Vitamin D deficiency    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HCC)    Sjogren's syndrome with keratoconjunctivitis sicca (CMS/HCC)    Osteomyelitis (CMS/HCC)    Simple chronic bronchitis (CMS/HCC)    Deep vein thrombosis (DVT) of other vein of lower extremity, unspecified chronicity, unspecified laterality (CMS/HCC)    Cervical radiculopathy    DDD (degenerative disc disease), lumbosacral    Edema of both legs    History of stroke    Knee pain, bilateral    Obstructive sleep apnea syndrome    Postlaminectomy syndrome, lumbar region    Temporomandibular joint pain dysfunction syndrome    Stroke (CMS/HCC)    Urine test positive for microalbuminuria    Chronic pain syndrome    Sjogren's syndrome (CMS/HCC)    Mixed simple and mucopurulent chronic bronchitis (CMS/HCC)    Controlled substance agreement signed    Screening for colon cancer    Constipation       Past Medical History:   Diagnosis  "Date    Acute bilateral low back pain with right-sided sciatica 06/15/2018    COPD with exacerbation (CMS/HCC) 02/15/2018    Esophagitis 02/12/2014    History of cervical spinal surgery 01/22/2018    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    History of lumbar surgery 01/22/2018    Left sided sciatica 12/02/2014    Other conditions influencing health status     Stroke syndrome    Otitis media, unspecified, bilateral 04/12/2022    Acute bilateral otitis media    Pain in right knee 11/10/2022    Acute pain of right knee    Personal history of other diseases of the digestive system     History of diverticulitis of colon    Personal history of other diseases of the digestive system     History of glossitis    Personal history of other diseases of the musculoskeletal system and connective tissue     History of fibromyositis    Personal history of other diseases of the musculoskeletal system and connective tissue     History of fibromyositis    Personal history of other diseases of the nervous system and sense organs 04/15/2022    History of otitis media    Simple chronic bronchitis (CMS/[facility]) 02/15/2018    Sjogren syndrome, unspecified (CMS/[facility])     Sjogrens syndrome    Unspecified otitis externa, unspecified ear 04/15/2022    Otitis externa      Past Surgical History:   Procedure Laterality Date    CATARACT EXTRACTION  11/15/2012    Cataract Surgery    CATARACT EXTRACTION  04/08/2013    Cataract Surgery    COLECTOMY      8\" colon removed    HYSTERECTOMY  11/15/2012    Hysterectomy    HYSTERECTOMY  04/08/2013    Hysterectomy    MR HEAD ANGIO WO IV CONTRAST  06/19/2023    MR HEAD ANGIO WO IV CONTRAST 6/19/2023 GEN MRI    MR NECK ANGIO WO IV CONTRAST  06/19/2023    MR NECK ANGIO WO IV CONTRAST 6/19/2023 GEN MRI    OTHER SURGICAL HISTORY  04/08/2013    Fusion / Refusion Of Vertebrae      Family History   Problem Relation Name Age of Onset    No Known Problems Mother      No Known Problems Father        Social History "     Tobacco Use    Smoking status: Former     Packs/day: 1.00     Years: 20.00     Additional pack years: 0.00     Total pack years: 20.00     Types: Cigarettes     Start date:      Quit date:      Years since quittin.9    Smokeless tobacco: Never   Substance Use Topics    Alcohol use: Never        Current Outpatient Medications:     albuterol 2.5 mg /3 mL (0.083 %) nebulizer solution, Take 3 mL (2.5 mg) by nebulization every 8 hours if needed for wheezing., Disp: 270 mL, Rfl: 1    ALPRAZolam (Xanax) 0.25 mg tablet, Take 1 tablet (0.25 mg) by mouth 2 times a day as needed for anxiety., Disp: 30 tablet, Rfl: 2    aspirin 81 mg EC tablet, Take 1 tablet (81 mg) by mouth once daily., Disp: 90 tablet, Rfl: 1    citalopram (CeleXA) 10 mg tablet, Take 1 tablet (10 mg) by mouth once daily., Disp: 90 tablet, Rfl: 1    folic acid (Folvite) 1 mg tablet, Take 1 tablet (1 mg) by mouth once daily., Disp: 90 tablet, Rfl: 1    labetalol (Normodyne) 200 mg tablet, Take 1 tablet (200 mg) by mouth 2 times a day., Disp: 180 tablet, Rfl: 1    latanoprost (Xalatan) 0.005 % ophthalmic solution, Latanoprost 0.005 % Ophthalmic Solution  Quantity: 8  Refills: 0      Start : 24-May-2021  Active, Disp: , Rfl:     levothyroxine (Synthroid) 50 mcg tablet, Take 1 tablet (50 mcg) by mouth once daily in the morning. Take before meals., Disp: 90 tablet, Rfl: 1    meclizine (Antivert) 25 mg tablet, Take 1 tablet (25 mg) by mouth 2 times a day as needed for dizziness for up to 30 doses., Disp: 30 tablet, Rfl: 0    metFORMIN (Glucophage) 500 mg tablet, Take 1 tablet (500 mg) by mouth 2 times a day., Disp: 180 tablet, Rfl: 1    omeprazole (PriLOSEC) 40 mg DR capsule, Take 1 capsule (40 mg) by mouth once daily in the morning. Take before meals., Disp: 90 capsule, Rfl: 1    oxygen (O2) therapy, Inhale 3 L/min at 180,000 mL/hr once daily at bedtime. via nasal canula, Disp: , Rfl:     peg 400-propylene glycol (SYSTANE) 0.4-0.3 % drops  "ophthalmic drops, ophtho, Disp: , Rfl:     simvastatin (Zocor) 20 mg tablet, Take 1 tablet (20 mg) by mouth once daily., Disp: 90 tablet, Rfl: 1    calcium citrate-vitamin D3 (Citracal + D Maximum) 315 mg-6.25 mcg (250 unit) tablet, Take 1 tablet (315 mg) by mouth 2 times a day. (Patient not taking: Reported on 10/11/2023), Disp: 180 tablet, Rfl: 1    estradiol (Estrace) 0.01 % (0.1 mg/gram) vaginal cream, Apply pea sized amount into vagina every Monday/Wednesday/Friday (Patient not taking: Reported on 10/11/2023), Disp: 85 g, Rfl: 1     Review of Systems  Constitutional: Denies fever  HEENT: Denies ST, earache  CVS: Denies Chest pain  Pulmonary: Denies wheezing, SOB  GI: Denies N/V  : Denies dysuria  Musculoskeletal:  Denies myalgia  Neuro: Denies focal weakness or numbness.  Skin: Denies Rashes.  *Review of Systems is negative unless otherwise mentioned in HPI or ROS above.    Objective   /50   Pulse 62   Temp 37 °C (98.6 °F)   Ht 1.575 m (5' 2\")   Wt 73.9 kg (163 lb)   SpO2 97%   BMI 29.81 kg/m²  reviewed Body mass index is 29.81 kg/m².     Physical Exam  Constitutional: NAD.  Resting comfortably.  Head: Atraumatic, normocephalic.  ENT: Moist oral mucosa. Nasal mucosa wnl.   Cardiac: Regular rate & rhythm.   Pulmonary: Lungs clear bilat  GI: Soft, Nontender, nondistended.   Musculoskeletal: No peripheral edema.   Skin: No evidence of trauma. No rashes  Psych: Intact judgement and insight.    .Assessment/Plan   Problem List Items Addressed This Visit             ICD-10-CM    Adult hypothyroidism E03.9    Relevant Medications    levothyroxine (Synthroid) 50 mcg tablet     Other Visit Diagnoses         Codes    Myalgia, multiple sites    -  Primary M79.18    Relevant Medications    tiZANidine (Zanaflex) 2 mg tablet    ibuprofen 400 mg tablet    Other Relevant Orders    Creatine Kinase    Comprehensive Metabolic Panel    Urinalysis with Reflex Microscopic    CBC    Referral to Physical Therapy    " Anxiety     F41.9    Pedal edema     R60.0    Relevant Orders    B-type natriuretic peptide    Hypertensive heart disease with heart failure (CMS/HCC)     I11.0    Relevant Orders    B-type natriuretic peptide

## 2023-12-12 NOTE — PATIENT INSTRUCTIONS
[facility]  Address: [address] [place] [address]  Hours:   Open ? Closes 3?PM  Confirmed by this business 8 weeks ago  Phone: [phone]  [facility]: [facility]

## 2023-12-29 ENCOUNTER — APPOINTMENT (OUTPATIENT)
Dept: PRIMARY CARE | Facility: CLINIC | Age: 83
End: 2023-12-29
Payer: MEDICARE

## 2023-12-29 DIAGNOSIS — I10 PRIMARY HYPERTENSION: ICD-10-CM

## 2023-12-29 RX ORDER — LABETALOL 200 MG/1
1 TABLET, FILM COATED ORAL 2 TIMES DAILY
Qty: 180 TABLET | Refills: 1 | Status: SHIPPED | OUTPATIENT
Start: 2023-12-29 | End: 2024-03-05 | Stop reason: SDUPTHER

## 2024-02-06 ENCOUNTER — LAB (OUTPATIENT)
Dept: LAB | Facility: LAB | Age: 84
End: 2024-02-06
Payer: MEDICARE

## 2024-02-06 DIAGNOSIS — M79.18 MYALGIA, MULTIPLE SITES: ICD-10-CM

## 2024-02-06 DIAGNOSIS — N30.01 ACUTE CYSTITIS WITH HEMATURIA: Primary | ICD-10-CM

## 2024-02-06 DIAGNOSIS — R60.0 PEDAL EDEMA: ICD-10-CM

## 2024-02-06 DIAGNOSIS — I11.0 HYPERTENSIVE HEART DISEASE WITH HEART FAILURE (MULTI): ICD-10-CM

## 2024-02-06 LAB
ALBUMIN SERPL BCP-MCNC: 4.1 G/DL (ref 3.4–5)
ALP SERPL-CCNC: 54 U/L (ref 33–136)
ALT SERPL W P-5'-P-CCNC: 6 U/L (ref 7–45)
ANION GAP SERPL CALC-SCNC: 16 MMOL/L (ref 10–20)
APPEARANCE UR: ABNORMAL
AST SERPL W P-5'-P-CCNC: 11 U/L (ref 9–39)
BILIRUB SERPL-MCNC: 0.8 MG/DL (ref 0–1.2)
BILIRUB UR STRIP.AUTO-MCNC: NEGATIVE MG/DL
BNP SERPL-MCNC: 225 PG/ML (ref 0–99)
BUN SERPL-MCNC: 14 MG/DL (ref 6–23)
CALCIUM SERPL-MCNC: 9.6 MG/DL (ref 8.6–10.3)
CHLORIDE SERPL-SCNC: 103 MMOL/L (ref 98–107)
CK SERPL-CCNC: 91 U/L (ref 0–215)
CO2 SERPL-SCNC: 26 MMOL/L (ref 21–32)
COLOR UR: YELLOW
CREAT SERPL-MCNC: 0.57 MG/DL (ref 0.5–1.05)
EGFRCR SERPLBLD CKD-EPI 2021: >90 ML/MIN/1.73M*2
ERYTHROCYTE [DISTWIDTH] IN BLOOD BY AUTOMATED COUNT: 13.5 % (ref 11.5–14.5)
GLUCOSE SERPL-MCNC: 148 MG/DL (ref 74–99)
GLUCOSE UR STRIP.AUTO-MCNC: NEGATIVE MG/DL
HCT VFR BLD AUTO: 42.3 % (ref 36–46)
HGB BLD-MCNC: 13.5 G/DL (ref 12–16)
HYALINE CASTS #/AREA URNS AUTO: ABNORMAL /LPF
KETONES UR STRIP.AUTO-MCNC: ABNORMAL MG/DL
LEUKOCYTE ESTERASE UR QL STRIP.AUTO: ABNORMAL
MCH RBC QN AUTO: 29.1 PG (ref 26–34)
MCHC RBC AUTO-ENTMCNC: 31.9 G/DL (ref 32–36)
MCV RBC AUTO: 91 FL (ref 80–100)
MUCOUS THREADS #/AREA URNS AUTO: ABNORMAL /LPF
NITRITE UR QL STRIP.AUTO: NEGATIVE
NRBC BLD-RTO: 0 /100 WBCS (ref 0–0)
PH UR STRIP.AUTO: 5 [PH]
PLATELET # BLD AUTO: 285 X10*3/UL (ref 150–450)
POTASSIUM SERPL-SCNC: 4 MMOL/L (ref 3.5–5.3)
PROT SERPL-MCNC: 6.8 G/DL (ref 6.4–8.2)
PROT UR STRIP.AUTO-MCNC: ABNORMAL MG/DL
RBC # BLD AUTO: 4.64 X10*6/UL (ref 4–5.2)
RBC # UR STRIP.AUTO: ABNORMAL /UL
RBC #/AREA URNS AUTO: ABNORMAL /HPF
SODIUM SERPL-SCNC: 141 MMOL/L (ref 136–145)
SP GR UR STRIP.AUTO: 1.02
SQUAMOUS #/AREA URNS AUTO: ABNORMAL /HPF
TRANS CELLS #/AREA UR COMP ASSIST: ABNORMAL /HPF
UROBILINOGEN UR STRIP.AUTO-MCNC: <2 MG/DL
WBC # BLD AUTO: 10.1 X10*3/UL (ref 4.4–11.3)
WBC #/AREA URNS AUTO: ABNORMAL /HPF

## 2024-02-06 PROCEDURE — 36415 COLL VENOUS BLD VENIPUNCTURE: CPT

## 2024-02-06 RX ORDER — NITROFURANTOIN 25; 75 MG/1; MG/1
100 CAPSULE ORAL 2 TIMES DAILY
Qty: 10 CAPSULE | Refills: 0 | Status: SHIPPED | OUTPATIENT
Start: 2024-02-06 | End: 2024-02-11

## 2024-02-07 DIAGNOSIS — R60.0 PEDAL EDEMA: Primary | ICD-10-CM

## 2024-02-07 RX ORDER — HYDROCHLOROTHIAZIDE 12.5 MG/1
12.5 TABLET ORAL DAILY
Qty: 90 TABLET | Refills: 0 | Status: SHIPPED | OUTPATIENT
Start: 2024-02-07 | End: 2024-03-05 | Stop reason: SDUPTHER

## 2024-02-20 DIAGNOSIS — F41.9 ANXIETY: ICD-10-CM

## 2024-02-20 RX ORDER — ALPRAZOLAM 0.25 MG/1
0.25 TABLET ORAL 2 TIMES DAILY PRN
Qty: 30 TABLET | Refills: 2 | Status: SHIPPED | OUTPATIENT
Start: 2024-02-20 | End: 2024-03-05 | Stop reason: SDUPTHER

## 2024-03-05 ENCOUNTER — OFFICE VISIT (OUTPATIENT)
Dept: PRIMARY CARE | Facility: CLINIC | Age: 84
End: 2024-03-05
Payer: MEDICARE

## 2024-03-05 VITALS
HEIGHT: 62 IN | OXYGEN SATURATION: 96 % | BODY MASS INDEX: 28.85 KG/M2 | TEMPERATURE: 98.6 F | WEIGHT: 156.8 LBS | HEART RATE: 60 BPM | DIASTOLIC BLOOD PRESSURE: 86 MMHG | SYSTOLIC BLOOD PRESSURE: 134 MMHG

## 2024-03-05 DIAGNOSIS — E03.9 ADULT HYPOTHYROIDISM: ICD-10-CM

## 2024-03-05 DIAGNOSIS — J41.0 SIMPLE CHRONIC BRONCHITIS (MULTI): ICD-10-CM

## 2024-03-05 DIAGNOSIS — R60.0 PEDAL EDEMA: ICD-10-CM

## 2024-03-05 DIAGNOSIS — I10 PRIMARY HYPERTENSION: ICD-10-CM

## 2024-03-05 DIAGNOSIS — M79.18 MYALGIA, MULTIPLE SITES: ICD-10-CM

## 2024-03-05 DIAGNOSIS — K21.9 GASTROESOPHAGEAL REFLUX DISEASE WITHOUT ESOPHAGITIS: ICD-10-CM

## 2024-03-05 DIAGNOSIS — R42 VERTIGO: ICD-10-CM

## 2024-03-05 DIAGNOSIS — E11.9 TYPE 2 DIABETES MELLITUS WITHOUT COMPLICATION, WITHOUT LONG-TERM CURRENT USE OF INSULIN (MULTI): ICD-10-CM

## 2024-03-05 DIAGNOSIS — F41.9 ANXIETY: ICD-10-CM

## 2024-03-05 DIAGNOSIS — E78.5 DYSLIPIDEMIA: ICD-10-CM

## 2024-03-05 LAB — POC HEMOGLOBIN A1C: 6.8 % (ref 4.2–6.5)

## 2024-03-05 PROCEDURE — 1160F RVW MEDS BY RX/DR IN RCRD: CPT | Performed by: PHYSICIAN ASSISTANT

## 2024-03-05 PROCEDURE — 1159F MED LIST DOCD IN RCRD: CPT | Performed by: PHYSICIAN ASSISTANT

## 2024-03-05 PROCEDURE — 83036 HEMOGLOBIN GLYCOSYLATED A1C: CPT | Performed by: PHYSICIAN ASSISTANT

## 2024-03-05 PROCEDURE — 99214 OFFICE O/P EST MOD 30 MIN: CPT | Performed by: PHYSICIAN ASSISTANT

## 2024-03-05 PROCEDURE — 1126F AMNT PAIN NOTED NONE PRSNT: CPT | Performed by: PHYSICIAN ASSISTANT

## 2024-03-05 PROCEDURE — 3075F SYST BP GE 130 - 139MM HG: CPT | Performed by: PHYSICIAN ASSISTANT

## 2024-03-05 PROCEDURE — 1036F TOBACCO NON-USER: CPT | Performed by: PHYSICIAN ASSISTANT

## 2024-03-05 PROCEDURE — 3079F DIAST BP 80-89 MM HG: CPT | Performed by: PHYSICIAN ASSISTANT

## 2024-03-05 RX ORDER — MECLIZINE HYDROCHLORIDE 25 MG/1
25 TABLET ORAL 2 TIMES DAILY PRN
Qty: 90 TABLET | Refills: 0 | Status: SHIPPED | OUTPATIENT
Start: 2024-03-05

## 2024-03-05 RX ORDER — ALPRAZOLAM 0.25 MG/1
0.25 TABLET ORAL 2 TIMES DAILY PRN
Qty: 60 TABLET | Refills: 2 | Status: SHIPPED | OUTPATIENT
Start: 2024-03-05 | End: 2024-06-06 | Stop reason: SDUPTHER

## 2024-03-05 RX ORDER — TIZANIDINE 2 MG/1
2 TABLET ORAL EVERY 8 HOURS PRN
Qty: 90 TABLET | Refills: 0 | Status: SHIPPED | OUTPATIENT
Start: 2024-03-05

## 2024-03-05 RX ORDER — METFORMIN HYDROCHLORIDE 500 MG/1
500 TABLET ORAL 2 TIMES DAILY
Qty: 180 TABLET | Refills: 1 | Status: SHIPPED | OUTPATIENT
Start: 2024-03-05 | End: 2024-09-01

## 2024-03-05 RX ORDER — SIMVASTATIN 20 MG/1
20 TABLET, FILM COATED ORAL DAILY
Qty: 90 TABLET | Refills: 1 | Status: SHIPPED | OUTPATIENT
Start: 2024-03-05 | End: 2024-09-01

## 2024-03-05 RX ORDER — CITALOPRAM 10 MG/1
10 TABLET ORAL DAILY
Qty: 90 TABLET | Refills: 1 | Status: SHIPPED | OUTPATIENT
Start: 2024-03-05 | End: 2024-03-05

## 2024-03-05 RX ORDER — IBUPROFEN 400 MG/1
400 TABLET ORAL EVERY 8 HOURS PRN
Qty: 90 TABLET | Refills: 0 | Status: SHIPPED | OUTPATIENT
Start: 2024-03-05 | End: 2024-03-25

## 2024-03-05 RX ORDER — OMEPRAZOLE 40 MG/1
40 CAPSULE, DELAYED RELEASE ORAL
Qty: 90 CAPSULE | Refills: 1 | Status: SHIPPED | OUTPATIENT
Start: 2024-03-05 | End: 2024-09-01

## 2024-03-05 RX ORDER — LABETALOL 200 MG/1
1 TABLET, FILM COATED ORAL 2 TIMES DAILY
Qty: 180 TABLET | Refills: 1 | Status: SHIPPED | OUTPATIENT
Start: 2024-03-05 | End: 2024-09-01

## 2024-03-05 RX ORDER — ALBUTEROL SULFATE 0.83 MG/ML
2.5 SOLUTION RESPIRATORY (INHALATION) EVERY 8 HOURS PRN
Qty: 270 ML | Refills: 1 | Status: SHIPPED | OUTPATIENT
Start: 2024-03-05 | End: 2024-09-01

## 2024-03-05 RX ORDER — CITALOPRAM 20 MG/1
20 TABLET, FILM COATED ORAL DAILY
Qty: 90 TABLET | Refills: 1 | Status: SHIPPED | OUTPATIENT
Start: 2024-03-05 | End: 2024-09-01

## 2024-03-05 RX ORDER — LEVOTHYROXINE SODIUM 50 UG/1
50 TABLET ORAL
Qty: 90 TABLET | Refills: 1 | Status: SHIPPED | OUTPATIENT
Start: 2024-03-05 | End: 2024-09-01

## 2024-03-05 RX ORDER — HYDROCHLOROTHIAZIDE 12.5 MG/1
12.5 TABLET ORAL DAILY
Qty: 90 TABLET | Refills: 1 | Status: SHIPPED | OUTPATIENT
Start: 2024-03-05 | End: 2024-06-06 | Stop reason: SDUPTHER

## 2024-03-05 NOTE — PATIENT INSTRUCTIONS
Increase celexa dose from to mg to 20mg      increase quantity of xanax to bid prn.     Biotene mouthwash for dry mouth

## 2024-03-22 ENCOUNTER — APPOINTMENT (OUTPATIENT)
Dept: PRIMARY CARE | Facility: CLINIC | Age: 84
End: 2024-03-22
Payer: MEDICARE

## 2024-03-25 DIAGNOSIS — M79.18 MYALGIA, MULTIPLE SITES: ICD-10-CM

## 2024-03-25 RX ORDER — IBUPROFEN 400 MG/1
400 TABLET ORAL EVERY 8 HOURS PRN
Qty: 90 TABLET | Refills: 3 | Status: SHIPPED | OUTPATIENT
Start: 2024-03-25 | End: 2024-06-05

## 2024-06-05 ENCOUNTER — APPOINTMENT (OUTPATIENT)
Dept: PRIMARY CARE | Facility: CLINIC | Age: 84
End: 2024-06-05
Payer: MEDICARE

## 2024-06-05 DIAGNOSIS — M79.18 MYALGIA, MULTIPLE SITES: ICD-10-CM

## 2024-06-05 RX ORDER — IBUPROFEN 400 MG/1
TABLET ORAL
Qty: 270 TABLET | Refills: 3 | Status: SHIPPED | OUTPATIENT
Start: 2024-06-05

## 2024-06-06 DIAGNOSIS — F41.9 ANXIETY: ICD-10-CM

## 2024-06-06 DIAGNOSIS — R60.0 PEDAL EDEMA: ICD-10-CM

## 2024-06-06 RX ORDER — ALPRAZOLAM 0.25 MG/1
0.25 TABLET ORAL 2 TIMES DAILY PRN
Qty: 60 TABLET | Refills: 0 | Status: SHIPPED | OUTPATIENT
Start: 2024-06-06 | End: 2024-07-06

## 2024-06-06 RX ORDER — HYDROCHLOROTHIAZIDE 12.5 MG/1
12.5 TABLET ORAL DAILY
Qty: 90 TABLET | Refills: 1 | Status: SHIPPED | OUTPATIENT
Start: 2024-06-06 | End: 2024-12-03

## 2024-06-11 PROBLEM — I82.499 DEEP VEIN THROMBOSIS (DVT) OF OTHER VEIN OF LOWER EXTREMITY, UNSPECIFIED CHRONICITY, UNSPECIFIED LATERALITY (MULTI): Status: RESOLVED | Noted: 2023-04-27 | Resolved: 2024-06-11

## 2024-06-11 PROBLEM — D89.9: Status: RESOLVED | Noted: 2023-04-27 | Resolved: 2024-06-11

## 2024-06-11 PROBLEM — Z12.11 SCREENING FOR COLON CANCER: Status: RESOLVED | Noted: 2023-10-11 | Resolved: 2024-06-11

## 2024-06-11 PROBLEM — J41.0 SIMPLE CHRONIC BRONCHITIS (MULTI): Status: RESOLVED | Noted: 2023-04-27 | Resolved: 2024-06-11

## 2024-06-11 NOTE — PROGRESS NOTES
Subjective   HPI   Linsey Donis is a 83 y.o. year old female patient with presenting to clinic with concern for Medicare Visit     Chief Complaint   Patient presents with    Medicare Annual Wellness Visit Subsequent     Pain. Anxiety. Panic attacks. Overall condition.       Need AVA 7, Urine drug and urine Micro    Labs due  Margarita due    Advance Care Planning    Advance Care Planning was discussed with patient and family. Advanced directives and POA and the patient's Advance Care Plan can be documented in the EMR when/if they have plans in order and provide paperwork      Refill xanax qty 60 x 30 days, anxiety has been more severe. Occasionally has taken a third tablet a day. Daughter  from cancer last weekend.    Anxiety has been worse lately. She lost a dear friend in a car accident a couple months ago. She has been awake  at night, crying, anxiety and upset, feels depressed, but also anxious and unable to regulate. Having panic attacks. Has been using xanax twice daily as written, but was using once daily until the past 4-6 weeks. Feels frustrated like  is controlling and is frustrated. She considers her little brother her support system.     Neck and back and back pain have been more severe. Ibuprofen is not enough. She has been to pain management and injections haven't helped. She not a surgical candidate for neck, knees or back.      Worried about NSAIDs d/t heart issues after reading medication insert. Discussed at length. She would need to use sparingly and at attenuated dose at her age.    OARRS:  No data recorded  I have personally reviewed the OARRS report for Linsey Donis. I have considered the risks of abuse, dependence, addiction and diversion    Is the patient prescribed a combination of a benzodiazepine and opioid?  No    Last Urine Drug Screen / ordered today: Yes  Recent Results (from the past 8760 hour(s))   OPIATE/OPIOID/BENZO PRESCRIPTION COMPLIANCE    Collection Time:  07/27/23 12:27 PM   Result Value Ref Range    DRUG SCREEN COMMENT URINE SEE BELOW     Creatine, Urine 207.1 mg/dL    Amphetamine Screen, Urine PRESUMPTIVE NEGATIVE NEGATIVE    Barbiturate Screen, Urine PRESUMPTIVE NEGATIVE NEGATIVE    Cannabinoid Screen, Urine PRESUMPTIVE NEGATIVE NEGATIVE    Cocaine Screen, Urine PRESUMPTIVE NEGATIVE NEGATIVE    PCP Screen, Urine PRESUMPTIVE NEGATIVE NEGATIVE    7-Aminoclonazepam <25 Cutoff <25 ng/mL    Alpha-Hydroxyalprazolam 165 (A) Cutoff <25 ng/mL    Alpha-Hydroxymidazolam <25 Cutoff <25 ng/mL    Alprazolam 113 (A) Cutoff <25 ng/mL    Chlordiazepoxide <25 Cutoff <25 ng/mL    Clonazepam <25 Cutoff <25 ng/mL    Diazepam <25 Cutoff <25 ng/mL    Lorazepam <25 Cutoff <25 ng/mL    Midazolam <25 Cutoff <25 ng/mL    Nordiazepam <25 Cutoff <25 ng/mL    Oxazepam <25 Cutoff <25 ng/mL    Temazepam <25 Cutoff <25 ng/mL    Zolpidem <25 Cutoff <25 ng/mL    Zolpidem Metabolite (ZCA) <25 Cutoff <25 ng/mL    6-Acetylmorphine <25 Cutoff <25 ng/mL    Codeine <50 Cutoff <50 ng/mL    Hydrocodone <25 Cutoff <25 ng/mL    Hydromorphone <25 Cutoff <25 ng/mL    Morphine Urine <50 Cutoff <50 ng/mL    Norhydrocodone <25 Cutoff <25 ng/mL    Noroxycodone <25 Cutoff <25 ng/mL    Oxycodone <25 Cutoff <25 ng/mL    Oxymorphone <25 Cutoff <25 ng/mL    Tramadol <50 Cutoff <50 ng/mL    O-Desmethyltramadol <50 Cutoff <50 ng/mL    Fentanyl <2.5 Cutoff<2.5 ng/mL    Norfentanyl <2.5 Cutoff<2.5 ng/mL    METHADONE CONFIRMATION,URINE <25 Cutoff <25 ng/mL    EDDP <25 Cutoff <25 ng/mL     Results are as expected.         Controlled Substance Agreement:  Date of the Last Agreement:  03/05/2024  Reviewed Controlled Substance Agreement including but not limited to the benefits, risks, and alternatives to treatment with a Controlled Substance medication(s).    Benzodiazepines:  What is the patient's goal of therapy? Reduction of anxiety, control of anxiety attacks  Is this being achieved with current treatment? Yes    AVA-7:  11    Activities of Daily Living:   Is your overall impression that this patient is benefiting (symptom reduction outweighs side effects) from benzodiazepine therapy? Yes     1. Physical Functioning: Better  2. Family Relationship: Same  3. Social Relationship: Better  4. Mood: Better  5. Sleep Patterns: Better  6. Overall Function: Better    Patient Care Team:  Yessenia Stephen PA-C as PCP - General (Family Medicine)  Yessenia Stephen PA-C as PCP - [facility] Attributed [facility]     Specialists    Past Medical, Surgical, and Family History reviewed and updated in chart.    Reviewed all medications by prescribing practitioner or clinical pharmacist (such as prescriptions, OTCs, herbal therapies and supplements) and documented in the medical record.     Preventative Health   Health Maintenance Due   Topic Date Due    Hepatitis A Vaccine (1 of 2 - Risk 2-dose series) Never done    Hepatitis B Vaccine (1 of 3 - Risk 3-dose series) Never done    Eye Exam  01/09/2020    Foot Exam  03/30/2024    Lipid (cholesterol) test  04/18/2024    Hemoglobin A1C  06/05/2024            Topic Date Due    Hepatitis A Vaccines (1 of 2 - Risk 2-dose series) Never done    Hepatitis B Vaccines (1 of 3 - Risk 3-dose series) Never done        Immunizations Reviewed  Immunization History   Administered Date(s) Administered    Influenza, Seasonal, Quadrivalent, Adjuvanted 09/12/2022    Pfizer COVID-19 vaccine, Fall 2023, 12 years and older, (30mcg/0.3mL) 01/08/2024    Pfizer COVID-19 vaccine, bivalent, age 12 years and older (30 mcg/0.3 mL) 09/12/2022    Pfizer Gray Cap SARS-CoV-2 04/10/2022    Pfizer Purple Cap SARS-CoV-2 02/04/2021, 10/23/2021    Pneumococcal conjugate vaccine, 13-valent (PREVNAR 13) 10/08/2015        Problem List Reviewed  Patient Active Problem List   Diagnosis    Allergic rhinitis    Anxiety disorder    Chronic fatigue    Chronic neck pain    Disorder of jaw    Dizziness    DM2 (diabetes mellitus, type 2) (Multi)     Dyslipidemia    Adult hypothyroidism    Enlarged thyroid    GERD (gastroesophageal reflux disease)    Gout    Hearing loss    HTN (hypertension)    Insomnia    Polyarthralgia    Vitamin D deficiency    Sjogren's syndrome with keratoconjunctivitis sicca (Multi)    Cervical radiculopathy    DDD (degenerative disc disease), lumbosacral    Edema of both legs    History of stroke    Knee pain, bilateral    Obstructive sleep apnea syndrome    Postlaminectomy syndrome, lumbar region    Temporomandibular joint pain dysfunction syndrome    Stroke (Multi)    Urine test positive for microalbuminuria    Chronic pain syndrome    Sjogren's syndrome (Multi)    Mixed simple and mucopurulent chronic bronchitis (Multi)    Controlled substance agreement signed    Constipation       Past Medical History:   Diagnosis Date    Acute bilateral low back pain with right-sided sciatica 06/15/2018    COPD with exacerbation (Multi) 02/15/2018    Deep vein thrombosis (DVT) of other vein of lower extremity, unspecified chronicity, unspecified laterality (Multi) 04/27/2023    Esophagitis 02/12/2014    History of cervical spinal surgery 01/22/2018    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    History of lumbar surgery 01/22/2018    Left sided sciatica 12/02/2014    Osteomyelitis (Multi) 10/18/2013    Other conditions influencing health status     Stroke syndrome    Otitis media, unspecified, bilateral 04/12/2022    Acute bilateral otitis media    Pain in right knee 11/10/2022    Acute pain of right knee    Personal history of other diseases of the digestive system     History of diverticulitis of colon    Personal history of other diseases of the digestive system     History of glossitis    Personal history of other diseases of the musculoskeletal system and connective tissue     History of fibromyositis    Personal history of other diseases of the musculoskeletal system and connective tissue     History of fibromyositis    Personal history of  "other diseases of the nervous system and sense organs 04/15/2022    History of otitis media    Simple chronic bronchitis (Multi) 02/15/2018    Sjogren syndrome, unspecified (Multi)     Sjogrens syndrome    Unspecified otitis externa, unspecified ear 04/15/2022    Otitis externa      Past Surgical History:   Procedure Laterality Date    CATARACT EXTRACTION  11/15/2012    Cataract Surgery    CATARACT EXTRACTION  2013    Cataract Surgery    COLECTOMY      8\" colon removed    HYSTERECTOMY  11/15/2012    Hysterectomy    HYSTERECTOMY  2013    Hysterectomy    MR HEAD ANGIO WO IV CONTRAST  2023    MR HEAD ANGIO WO IV CONTRAST 2023 GEN MRI    MR NECK ANGIO WO IV CONTRAST  2023    MR NECK ANGIO WO IV CONTRAST 2023 GEN MRI    OTHER SURGICAL HISTORY  2013    Fusion / Refusion Of Vertebrae      Family History   Problem Relation Name Age of Onset    No Known Problems Mother      No Known Problems Father        Social History     Tobacco Use    Smoking status: Former     Current packs/day: 0.00     Average packs/day: 1 pack/day for 20.0 years (20.0 ttl pk-yrs)     Types: Cigarettes     Start date:      Quit date:      Years since quittin.4    Smokeless tobacco: Never   Substance Use Topics    Alcohol use: Never        Medications Reviewed  Current Outpatient Medications on File Prior to Visit   Medication Sig Dispense Refill    albuterol 2.5 mg /3 mL (0.083 %) nebulizer solution Take 3 mL (2.5 mg) by nebulization every 8 hours if needed for wheezing. 270 mL 1    citalopram (CeleXA) 20 mg tablet Take 1 tablet (20 mg) by mouth once daily. 90 tablet 1    hydroCHLOROthiazide (Microzide) 12.5 mg tablet Take 1 tablet (12.5 mg) by mouth once daily. 90 tablet 1    ibuprofen 400 mg tablet TAKE 1 TABLET BY MOUTH EVERY 8  HOURS IF NEEDED FOR MODERATE  PAIN (4-6) 270 tablet 3    labetalol (Normodyne) 200 mg tablet Take 1 tablet (200 mg) by mouth 2 times a day. 180 tablet 1    latanoprost " (Xalatan) 0.005 % ophthalmic solution Latanoprost 0.005 % Ophthalmic Solution   Quantity: 8  Refills: 0        Start : 24-May-2021   Active      levothyroxine (Synthroid) 50 mcg tablet Take 1 tablet (50 mcg) by mouth once daily in the morning. Take before meals. 90 tablet 1    meclizine (Antivert) 25 mg tablet Take 1 tablet (25 mg) by mouth 2 times a day as needed for dizziness for up to 90 doses. 90 tablet 0    metFORMIN (Glucophage) 500 mg tablet Take 1 tablet (500 mg) by mouth 2 times a day. 180 tablet 1    omeprazole (PriLOSEC) 40 mg DR capsule Take 1 capsule (40 mg) by mouth once daily in the morning. Take before meals. 90 capsule 1    oxygen (O2) therapy Inhale 3 L/min at 180,000 mL/hr once daily at bedtime. via nasal canula      peg 400-propylene glycol (SYSTANE) 0.4-0.3 % drops ophthalmic drops ophtho      simvastatin (Zocor) 20 mg tablet Take 1 tablet (20 mg) by mouth once daily. 90 tablet 1    tiZANidine (Zanaflex) 2 mg tablet Take 1 tablet (2 mg) by mouth every 8 hours if needed for muscle spasms. 90 tablet 0    [DISCONTINUED] ALPRAZolam (Xanax) 0.25 mg tablet Take 1 tablet (0.25 mg) by mouth 2 times a day as needed for anxiety. 60 tablet 2    [DISCONTINUED] ALPRAZolam (Xanax) 0.25 mg tablet Take 1 tablet (0.25 mg) by mouth 2 times a day as needed for anxiety. 60 tablet 0    [DISCONTINUED] hydroCHLOROthiazide (Microzide) 12.5 mg tablet Take 1 tablet (12.5 mg) by mouth once daily. 90 tablet 1     No current facility-administered medications on file prior to visit.        Review of Systems  Constitutional: Denies fever  HEENT: Denies ST, earache  CVS: Denies Chest pain  Pulmonary: Denies wheezing, SOB  GI: Denies N/V  : Denies dysuria  Musculoskeletal:  Denies myalgia  Neuro: Denies focal weakness or numbness.  Skin: Denies Rashes.  *Review of Systems is negative unless otherwise mentioned in HPI or ROS above.      @OBJECTIVEBEGIN  /80   Pulse 55 Comment: taken twice, different fingers  Temp 35.9  "°C (96.7 °F)   Ht 1.562 m (5' 1.5\")   Wt 74.3 kg (163 lb 12.8 oz)   SpO2 98%   BMI 30.45 kg/m²  reviewed Body mass index is 30.45 kg/m².     Physical Exam  Constitutional: NAD. Afebrile. Resting comfortably.  ENT: Nasal mucosa and oropharynx: moist oral mucosa. Posterior oropharynx nonerythematous. No posterior pharyngeal streaking.  Eyes: PERRLA. EOM intact. No vertical or circular nystagmus.  Lymph: No anterior cervical chain or submandibular lymphadenopathy. No sentinel lymph nodes.  Cardiac: Regular rate & rhythm. No murmur, gallops, or rubs.  Pulmonary: Lungs clear to auscultation bilaterally with good aeration. No wheezes, rhonchi, or rales. Normal WOB.  GI: Soft, Nontender, nondistended. No guarding. Normal BS x4.  : No suprapubic tenderness. No CVA tenderness to percussion.   Musculoskeletal: No peripheral edema.   Skin: No evidence of trauma. No rashes  Neuro: No focal neuro deficits. Normal gait without assistive devices.  Psych: Intact judgement and insight.      .Assessment/Plan   Problem List Items Addressed This Visit             ICD-10-CM    Chronic neck pain M54.2, G89.29    Relevant Medications    gabapentin (Neurontin) 100 mg capsule    gabapentin (Neurontin) 300 mg capsule (Start on 6/27/2024)    DM2 (diabetes mellitus, type 2) (Multi) E11.9     Monitor A1c and urine micro  On metformin low dose and on a statin             Relevant Orders    POCT Albumin Random Urine manually resulted (Completed)    Sjogren's syndrome with keratoconjunctivitis sicca (Multi) M35.01     Regular optho appointments         Stroke (Multi) I63.9     1990 mild per pt         Sjogren's syndrome (Multi) M35.00    Mixed simple and mucopurulent chronic bronchitis (Multi) J41.8     albuterol         Controlled substance agreement signed Z79.899    Relevant Orders    Opiate/Opioid/Benzo Prescription Compliance    OOB Internal Tracking     Other Visit Diagnoses         Codes    Medicare annual wellness visit, subsequent   "  -  Primary Z00.00    Borderline hyperlipidemia     E78.5    Relevant Orders    CBC    Comprehensive Metabolic Panel    TSH with reflex to Free T4 if abnormal    Lipid Panel    Vitamin D insufficiency     E55.9    Relevant Orders    Vitamin D 25-Hydroxy,Total (for eval of Vitamin D levels)    B12 deficiency     E53.8    Relevant Orders    Vitamin B12    Screening mammogram for breast cancer     Z12.31    Relevant Orders    BI mammo bilateral screening tomosynthesis    Anxiety     F41.9    Relevant Medications    ALPRAZolam (Xanax) 0.25 mg tablet    Chronic bilateral low back pain without sciatica     M54.50, G89.29    Relevant Medications    gabapentin (Neurontin) 100 mg capsule    gabapentin (Neurontin) 300 mg capsule (Start on 6/27/2024)

## 2024-06-12 ENCOUNTER — APPOINTMENT (OUTPATIENT)
Dept: PRIMARY CARE | Facility: CLINIC | Age: 84
End: 2024-06-12
Payer: MEDICARE

## 2024-06-12 VITALS
HEIGHT: 62 IN | BODY MASS INDEX: 30.14 KG/M2 | HEART RATE: 55 BPM | WEIGHT: 163.8 LBS | DIASTOLIC BLOOD PRESSURE: 80 MMHG | OXYGEN SATURATION: 98 % | TEMPERATURE: 96.7 F | SYSTOLIC BLOOD PRESSURE: 154 MMHG

## 2024-06-12 DIAGNOSIS — Z00.00 MEDICARE ANNUAL WELLNESS VISIT, SUBSEQUENT: Primary | ICD-10-CM

## 2024-06-12 DIAGNOSIS — Z12.31 SCREENING MAMMOGRAM FOR BREAST CANCER: ICD-10-CM

## 2024-06-12 DIAGNOSIS — M54.2 CHRONIC NECK PAIN: ICD-10-CM

## 2024-06-12 DIAGNOSIS — E78.5 BORDERLINE HYPERLIPIDEMIA: ICD-10-CM

## 2024-06-12 DIAGNOSIS — F41.9 ANXIETY: ICD-10-CM

## 2024-06-12 DIAGNOSIS — G89.29 CHRONIC BILATERAL LOW BACK PAIN WITHOUT SCIATICA: ICD-10-CM

## 2024-06-12 DIAGNOSIS — M35.00 SJOGREN'S SYNDROME, WITH UNSPECIFIED ORGAN INVOLVEMENT (MULTI): ICD-10-CM

## 2024-06-12 DIAGNOSIS — E53.8 B12 DEFICIENCY: ICD-10-CM

## 2024-06-12 DIAGNOSIS — J41.8 MIXED SIMPLE AND MUCOPURULENT CHRONIC BRONCHITIS (MULTI): ICD-10-CM

## 2024-06-12 DIAGNOSIS — M35.01 SJOGREN'S SYNDROME WITH KERATOCONJUNCTIVITIS SICCA (MULTI): ICD-10-CM

## 2024-06-12 DIAGNOSIS — G89.29 CHRONIC NECK PAIN: ICD-10-CM

## 2024-06-12 DIAGNOSIS — E55.9 VITAMIN D INSUFFICIENCY: ICD-10-CM

## 2024-06-12 DIAGNOSIS — I63.9 CEREBROVASCULAR ACCIDENT (CVA), UNSPECIFIED MECHANISM (MULTI): ICD-10-CM

## 2024-06-12 DIAGNOSIS — Z79.899 CONTROLLED SUBSTANCE AGREEMENT SIGNED: ICD-10-CM

## 2024-06-12 DIAGNOSIS — M54.50 CHRONIC BILATERAL LOW BACK PAIN WITHOUT SCIATICA: ICD-10-CM

## 2024-06-12 DIAGNOSIS — E11.9 TYPE 2 DIABETES MELLITUS WITHOUT COMPLICATION, WITHOUT LONG-TERM CURRENT USE OF INSULIN (MULTI): ICD-10-CM

## 2024-06-12 LAB
POC ALBUMIN /CREATININE RATIO MANUALLY ENTERED: ABNORMAL UG/MG CREAT
POC URINE ALBUMIN: 80 MG/L
POC URINE CREATININE: 200 MG/DL

## 2024-06-12 PROCEDURE — 80346 BENZODIAZEPINES1-12: CPT

## 2024-06-12 PROCEDURE — 82570 ASSAY OF URINE CREATININE: CPT

## 2024-06-12 PROCEDURE — 1159F MED LIST DOCD IN RCRD: CPT | Performed by: PHYSICIAN ASSISTANT

## 2024-06-12 PROCEDURE — 3079F DIAST BP 80-89 MM HG: CPT | Performed by: PHYSICIAN ASSISTANT

## 2024-06-12 PROCEDURE — 1124F ACP DISCUSS-NO DSCNMKR DOCD: CPT | Performed by: PHYSICIAN ASSISTANT

## 2024-06-12 PROCEDURE — 80354 DRUG SCREENING FENTANYL: CPT

## 2024-06-12 PROCEDURE — 82044 UR ALBUMIN SEMIQUANTITATIVE: CPT | Performed by: PHYSICIAN ASSISTANT

## 2024-06-12 PROCEDURE — 3077F SYST BP >= 140 MM HG: CPT | Performed by: PHYSICIAN ASSISTANT

## 2024-06-12 PROCEDURE — 1036F TOBACCO NON-USER: CPT | Performed by: PHYSICIAN ASSISTANT

## 2024-06-12 PROCEDURE — 80361 OPIATES 1 OR MORE: CPT

## 2024-06-12 PROCEDURE — G0439 PPPS, SUBSEQ VISIT: HCPCS | Performed by: PHYSICIAN ASSISTANT

## 2024-06-12 PROCEDURE — 80373 DRUG SCREENING TRAMADOL: CPT

## 2024-06-12 PROCEDURE — 80307 DRUG TEST PRSMV CHEM ANLYZR: CPT

## 2024-06-12 PROCEDURE — 1160F RVW MEDS BY RX/DR IN RCRD: CPT | Performed by: PHYSICIAN ASSISTANT

## 2024-06-12 PROCEDURE — 80365 DRUG SCREENING OXYCODONE: CPT

## 2024-06-12 PROCEDURE — 80368 SEDATIVE HYPNOTICS: CPT

## 2024-06-12 PROCEDURE — 80358 DRUG SCREENING METHADONE: CPT

## 2024-06-12 RX ORDER — GABAPENTIN 300 MG/1
300 CAPSULE ORAL 2 TIMES DAILY
Qty: 180 CAPSULE | Refills: 0 | Status: SHIPPED | OUTPATIENT
Start: 2024-06-27

## 2024-06-12 RX ORDER — ALPRAZOLAM 0.25 MG/1
0.25 TABLET ORAL 2 TIMES DAILY PRN
Qty: 60 TABLET | Refills: 2 | Status: SHIPPED | OUTPATIENT
Start: 2024-06-12 | End: 2024-06-12

## 2024-06-12 RX ORDER — GABAPENTIN 100 MG/1
CAPSULE ORAL 2 TIMES DAILY
Qty: 42 CAPSULE | Refills: 0 | Status: SHIPPED | OUTPATIENT
Start: 2024-06-12 | End: 2024-06-26

## 2024-06-12 RX ORDER — ALPRAZOLAM 0.25 MG/1
0.25 TABLET ORAL 3 TIMES DAILY PRN
Qty: 90 TABLET | Refills: 2 | Status: SHIPPED | OUTPATIENT
Start: 2024-06-12

## 2024-06-12 ASSESSMENT — PATIENT HEALTH QUESTIONNAIRE - PHQ9
SUM OF ALL RESPONSES TO PHQ9 QUESTIONS 1 AND 2: 0
1. LITTLE INTEREST OR PLEASURE IN DOING THINGS: NOT AT ALL
2. FEELING DOWN, DEPRESSED OR HOPELESS: NOT AT ALL

## 2024-06-12 ASSESSMENT — ANXIETY QUESTIONNAIRES
3. WORRYING TOO MUCH ABOUT DIFFERENT THINGS: MORE THAN HALF THE DAYS
IF YOU CHECKED OFF ANY PROBLEMS ON THIS QUESTIONNAIRE, HOW DIFFICULT HAVE THESE PROBLEMS MADE IT FOR YOU TO DO YOUR WORK, TAKE CARE OF THINGS AT HOME, OR GET ALONG WITH OTHER PEOPLE: NOT DIFFICULT AT ALL
GAD7 TOTAL SCORE: 11
6. BECOMING EASILY ANNOYED OR IRRITABLE: MORE THAN HALF THE DAYS
7. FEELING AFRAID AS IF SOMETHING AWFUL MIGHT HAPPEN: SEVERAL DAYS
5. BEING SO RESTLESS THAT IT IS HARD TO SIT STILL: NOT AT ALL
2. NOT BEING ABLE TO STOP OR CONTROL WORRYING: SEVERAL DAYS
4. TROUBLE RELAXING: MORE THAN HALF THE DAYS
1. FEELING NERVOUS, ANXIOUS, OR ON EDGE: NEARLY EVERY DAY

## 2024-06-13 LAB
AMPHETAMINES UR QL SCN: NORMAL
BARBITURATES UR QL SCN: NORMAL
BZE UR QL SCN: NORMAL
CANNABINOIDS UR QL SCN: NORMAL
CREAT UR-MCNC: 188.6 MG/DL (ref 20–320)
PCP UR QL SCN: NORMAL

## 2024-06-17 LAB
1OH-MIDAZOLAM UR CFM-MCNC: <25 NG/ML
6MAM UR CFM-MCNC: <25 NG/ML
7AMINOCLONAZEPAM UR CFM-MCNC: <25 NG/ML
A-OH ALPRAZ UR CFM-MCNC: 225 NG/ML
ALPRAZ UR CFM-MCNC: 135 NG/ML
CHLORDIAZEP UR CFM-MCNC: <25 NG/ML
CLONAZEPAM UR CFM-MCNC: <25 NG/ML
CODEINE UR CFM-MCNC: <50 NG/ML
DIAZEPAM UR CFM-MCNC: <25 NG/ML
EDDP UR CFM-MCNC: <25 NG/ML
FENTANYL UR CFM-MCNC: <2.5 NG/ML
HYDROCODONE CTO UR CFM-MCNC: <25 NG/ML
HYDROMORPHONE UR CFM-MCNC: <25 NG/ML
LORAZEPAM UR CFM-MCNC: <25 NG/ML
METHADONE UR CFM-MCNC: <25 NG/ML
MIDAZOLAM UR CFM-MCNC: <25 NG/ML
MORPHINE UR CFM-MCNC: <50 NG/ML
NORDIAZEPAM UR CFM-MCNC: <25 NG/ML
NORFENTANYL UR CFM-MCNC: <2.5 NG/ML
NORHYDROCODONE UR CFM-MCNC: <25 NG/ML
NOROXYCODONE UR CFM-MCNC: <25 NG/ML
NORTRAMADOL UR-MCNC: <50 NG/ML
OXAZEPAM UR CFM-MCNC: <25 NG/ML
OXYCODONE UR CFM-MCNC: <25 NG/ML
OXYMORPHONE UR CFM-MCNC: <25 NG/ML
TEMAZEPAM UR CFM-MCNC: <25 NG/ML
TRAMADOL UR CFM-MCNC: <50 NG/ML
ZOLPIDEM UR CFM-MCNC: <25 NG/ML
ZOLPIDEM UR-MCNC: <25 NG/ML

## 2024-07-24 DIAGNOSIS — K21.9 GASTROESOPHAGEAL REFLUX DISEASE WITHOUT ESOPHAGITIS: ICD-10-CM

## 2024-07-24 DIAGNOSIS — E78.5 DYSLIPIDEMIA: ICD-10-CM

## 2024-07-24 DIAGNOSIS — E11.9 TYPE 2 DIABETES MELLITUS WITHOUT COMPLICATION, WITHOUT LONG-TERM CURRENT USE OF INSULIN (MULTI): ICD-10-CM

## 2024-07-24 DIAGNOSIS — I10 PRIMARY HYPERTENSION: ICD-10-CM

## 2024-07-24 RX ORDER — LABETALOL 200 MG/1
1 TABLET, FILM COATED ORAL 2 TIMES DAILY
Qty: 180 TABLET | Refills: 3 | Status: SHIPPED | OUTPATIENT
Start: 2024-07-24

## 2024-07-24 RX ORDER — OMEPRAZOLE 40 MG/1
40 CAPSULE, DELAYED RELEASE ORAL
Qty: 90 CAPSULE | Refills: 3 | Status: SHIPPED | OUTPATIENT
Start: 2024-07-24

## 2024-07-24 RX ORDER — SIMVASTATIN 20 MG/1
20 TABLET, FILM COATED ORAL DAILY
Qty: 90 TABLET | Refills: 3 | Status: SHIPPED | OUTPATIENT
Start: 2024-07-24

## 2024-07-24 RX ORDER — METFORMIN HYDROCHLORIDE 500 MG/1
500 TABLET ORAL 2 TIMES DAILY
Qty: 180 TABLET | Refills: 3 | Status: SHIPPED | OUTPATIENT
Start: 2024-07-24

## 2024-08-22 ENCOUNTER — LAB (OUTPATIENT)
Dept: LAB | Facility: LAB | Age: 84
End: 2024-08-22
Payer: MEDICARE

## 2024-08-22 DIAGNOSIS — E78.5 BORDERLINE HYPERLIPIDEMIA: ICD-10-CM

## 2024-08-22 DIAGNOSIS — E53.8 B12 DEFICIENCY: ICD-10-CM

## 2024-08-22 DIAGNOSIS — E55.9 VITAMIN D INSUFFICIENCY: ICD-10-CM

## 2024-08-22 LAB
25(OH)D3 SERPL-MCNC: 40 NG/ML (ref 30–100)
ALBUMIN SERPL BCP-MCNC: 4 G/DL (ref 3.4–5)
ALP SERPL-CCNC: 48 U/L (ref 33–136)
ALT SERPL W P-5'-P-CCNC: 11 U/L (ref 7–45)
ANION GAP SERPL CALC-SCNC: 14 MMOL/L (ref 10–20)
AST SERPL W P-5'-P-CCNC: 14 U/L (ref 9–39)
BILIRUB SERPL-MCNC: 0.7 MG/DL (ref 0–1.2)
BUN SERPL-MCNC: 14 MG/DL (ref 6–23)
CALCIUM SERPL-MCNC: 9.7 MG/DL (ref 8.6–10.3)
CHLORIDE SERPL-SCNC: 102 MMOL/L (ref 98–107)
CHOLEST SERPL-MCNC: 151 MG/DL (ref 0–199)
CHOLESTEROL/HDL RATIO: 3
CO2 SERPL-SCNC: 28 MMOL/L (ref 21–32)
CREAT SERPL-MCNC: 0.7 MG/DL (ref 0.5–1.05)
EGFRCR SERPLBLD CKD-EPI 2021: 86 ML/MIN/1.73M*2
ERYTHROCYTE [DISTWIDTH] IN BLOOD BY AUTOMATED COUNT: 13.4 % (ref 11.5–14.5)
GLUCOSE SERPL-MCNC: 153 MG/DL (ref 74–99)
HCT VFR BLD AUTO: 40.8 % (ref 36–46)
HDLC SERPL-MCNC: 49.7 MG/DL
HGB BLD-MCNC: 13.3 G/DL (ref 12–16)
LDLC SERPL CALC-MCNC: 74 MG/DL
MCH RBC QN AUTO: 29.7 PG (ref 26–34)
MCHC RBC AUTO-ENTMCNC: 32.6 G/DL (ref 32–36)
MCV RBC AUTO: 91 FL (ref 80–100)
NON HDL CHOLESTEROL: 101 MG/DL (ref 0–149)
NRBC BLD-RTO: 0 /100 WBCS (ref 0–0)
PLATELET # BLD AUTO: 235 X10*3/UL (ref 150–450)
POTASSIUM SERPL-SCNC: 4 MMOL/L (ref 3.5–5.3)
PROT SERPL-MCNC: 6.8 G/DL (ref 6.4–8.2)
RBC # BLD AUTO: 4.48 X10*6/UL (ref 4–5.2)
SODIUM SERPL-SCNC: 140 MMOL/L (ref 136–145)
TRIGL SERPL-MCNC: 135 MG/DL (ref 0–149)
TSH SERPL-ACNC: 2.1 MIU/L (ref 0.44–3.98)
VIT B12 SERPL-MCNC: 693 PG/ML (ref 211–911)
VLDL: 27 MG/DL (ref 0–40)
WBC # BLD AUTO: 9.7 X10*3/UL (ref 4.4–11.3)

## 2024-08-22 PROCEDURE — 82607 VITAMIN B-12: CPT

## 2024-08-22 PROCEDURE — 36415 COLL VENOUS BLD VENIPUNCTURE: CPT

## 2024-08-22 PROCEDURE — 82306 VITAMIN D 25 HYDROXY: CPT

## 2024-08-28 DIAGNOSIS — G89.29 CHRONIC NECK PAIN: ICD-10-CM

## 2024-08-28 DIAGNOSIS — M54.2 CHRONIC NECK PAIN: ICD-10-CM

## 2024-08-28 DIAGNOSIS — M54.50 CHRONIC BILATERAL LOW BACK PAIN WITHOUT SCIATICA: ICD-10-CM

## 2024-08-28 DIAGNOSIS — G89.29 CHRONIC BILATERAL LOW BACK PAIN WITHOUT SCIATICA: ICD-10-CM

## 2024-08-28 RX ORDER — GABAPENTIN 300 MG/1
300 CAPSULE ORAL 2 TIMES DAILY
Qty: 180 CAPSULE | Refills: 3 | Status: SHIPPED | OUTPATIENT
Start: 2024-08-28

## 2024-09-03 DIAGNOSIS — J41.0 SIMPLE CHRONIC BRONCHITIS (MULTI): ICD-10-CM

## 2024-09-03 RX ORDER — ALBUTEROL SULFATE 0.83 MG/ML
2.5 SOLUTION RESPIRATORY (INHALATION) EVERY 8 HOURS PRN
Qty: 270 ML | Refills: 1 | Status: SHIPPED | OUTPATIENT
Start: 2024-09-03 | End: 2025-03-02

## 2024-09-11 NOTE — PROGRESS NOTES
Subjective     HPI   Linsey Donis is a 84 y.o. year old female patient with presenting to clinic with concern for   Chief Complaint   Patient presents with    3 month Diabetes management     A1C/ Micro done today  Recovered from Covid       Linsey presents to clinic for 3 month follow up    Had COVID  2 weeks ago    Recent Results (from the past 2 hour(s))   POCT Albumin Random Urine manually resulted    Collection Time: 24 10:13 AM   Result Value Ref Range    POC Urine Albumin 80 No Reference Range Established mg/L    POC Urine Creatinine 200 No Reference Range Established mg/dl    POC ALBUMIN /CREATININE RATIO MANUALLY ENTERED 30 - 300 (A) <30 ug/mg Creat   POCT glycosylated hemoglobin (Hb A1C) manually resulted    Collection Time: 24 10:17 AM   Result Value Ref Range    POC HEMOGLOBIN A1c 7.0 (A) 4.2 - 6.5 %         Labs done 2 weeks ago  Labs are acceptable including blood counts, kidney and liver function, thyroid testing, B12, vitamin D, and cholesterol. Continue current medications     Refill xanax qty 60 x 30 days, anxiety has been more severe.     Anxiety has been worse lately. She lost a dear friend in a car accident a couple months ago. Daughter  in MVC 3 months ago.   Worsened for months. Apparently she had not been taking the celexa at all because she didn't feel immediate effects and she stopped it.   She has been awake  at night, crying, anxiety and upset, feels depressed, but also anxious and unable to regulate. Was having more panic attacks.  Feels frustrated like  is controlling and is frustrated. She considers her little brother her support system.     Neck and back and back pain have been more severe. Ibuprofen is not enough. She has been to pain management and injections haven't helped. She not a surgical candidate for neck, knees or back.    OARRS:  Yessenia Stephen PA-C on 2024  7:28 PM  I have personally reviewed the OARRS report for Linsey Donis. I  have considered the risks of abuse, dependence, addiction and diversion    Is the patient prescribed a combination of a benzodiazepine and opioid?  Yes, I feel it is clincially indicated to continue the medication and have discussed with the patient risks/benefits/alternatives.    Last Urine Drug Screen / ordered today: No  Recent Results (from the past 8760 hour(s))   Confirmation Opiate/Opioid/Benzo Prescription Compliance    Collection Time: 06/12/24  8:48 AM   Result Value Ref Range    Clonazepam <25 <25 ng/mL    7-Aminoclonazepam <25 <25 ng/mL    Alprazolam 135 (H) <25 ng/mL    Alpha-Hydroxyalprazolam 225 (H) <25 ng/mL    Midazolam <25 <25 ng/mL    Alpha-Hydroxymidazolam <25 <25 ng/mL    Chlordiazepoxide <25 <25 ng/mL    Diazepam <25 <25 ng/mL    Nordiazepam <25 <25 ng/mL    Temazepam <25 <25 ng/mL    Oxazepam <25 <25 ng/mL    Lorazepam <25 <25 ng/mL    Methadone <25 <25 ng/mL    EDDP <25 <25 ng/mL    6-Acetylmorphine <25 <25 ng/mL    Codeine <50 <50 ng/mL    Hydrocodone <25 <25 ng/mL    Hydromorphone <25 <25 ng/mL    Morphine  <50 <50 ng/mL    Norhydrocodone <25 <25 ng/mL    Noroxycodone <25 <25 ng/mL    Oxycodone <25 <25 ng/mL    Oxymorphone <25 <25 ng/mL    Fentanyl <2.5 <2.5 ng/mL    Norfentanyl <2.5 <2.5 ng/mL    Tramadol <50 <50 ng/mL    O-Desmethyltramadol <50 <50 ng/mL    Zolpidem <25 <25 ng/mL    Zolpidem Metabolite (ZCA) <25 <25 ng/mL   Screen Opiate/Opioid/Benzo Prescription Compliance    Collection Time: 06/12/24  8:48 AM   Result Value Ref Range    Creatinine, Urine Random 188.6 20.0 - 320.0 mg/dL    Amphetamine Screen, Urine Presumptive Negative Presumptive Negative    Barbiturate Screen, Urine Presumptive Negative Presumptive Negative    Cannabinoid Screen, Urine Presumptive Negative Presumptive Negative    Cocaine Metabolite Screen, Urine Presumptive Negative Presumptive Negative    PCP Screen, Urine Presumptive Negative Presumptive Negative     Results are as expected.         Controlled  Substance Agreement:  Date of the Last Agreement: 24  Reviewed Controlled Substance Agreement including but not limited to the benefits, risks, and alternatives to treatment with a Controlled Substance medication(s).    Benzodiazepines:  What is the patient's goal of therapy? Reduction of anxiety, control of anxiety attacks  Is this being achieved with current treatment? yes    AVA-7:  Over the last 2 weeks, how often have you been bothered by any of the following problems?  Feeling nervous, anxious, or on edge: 3  Not being able to stop or control worryin  Worrying too much about different things: 2  Trouble relaxing: 3  Being so restless that it is hard to sit still: 1  Becoming easily annoyed or irritable: 2  Feeling afraid as if something awful might happen: 1  AVA-7 Total Score: 14        Activities of Daily Living:   Is your overall impression that this patient is benefiting (symptom reduction outweighs side effects) from benzodiazepine therapy? Yes     1. Physical Functioning: Same  2. Family Relationship: Better  3. Social Relationship: Same  4. Mood: Same  5. Sleep Patterns: Same  6. Overall Function: Same      Patient Active Problem List   Diagnosis    Allergic rhinitis    Anxiety disorder    Chronic fatigue    Chronic neck pain    Disorder of jaw    Dizziness    DM2 (diabetes mellitus, type 2) (Multi)    Dyslipidemia    Adult hypothyroidism    Enlarged thyroid    GERD (gastroesophageal reflux disease)    Gout    Hearing loss    HTN (hypertension)    Insomnia    Polyarthralgia    Vitamin D deficiency    Sjogren's syndrome with keratoconjunctivitis sicca (Multi)    Cervical radiculopathy    DDD (degenerative disc disease), lumbosacral    Edema of both legs    History of stroke    Knee pain, bilateral    Obstructive sleep apnea syndrome    Postlaminectomy syndrome, lumbar region    Temporomandibular joint pain dysfunction syndrome    Stroke (Multi)    Urine test positive for microalbuminuria     "Chronic pain syndrome    Sjogren's syndrome (Multi)    Mixed simple and mucopurulent chronic bronchitis (Multi)    Controlled substance agreement signed    Constipation    Hypertensive heart disease with heart failure (Multi)       Past Medical History:   Diagnosis Date    Acute bilateral low back pain with right-sided sciatica 06/15/2018    COPD with exacerbation (Multi) 02/15/2018    Deep vein thrombosis (DVT) of other vein of lower extremity, unspecified chronicity, unspecified laterality (Multi) 04/27/2023    Esophagitis 02/12/2014    History of cervical spinal surgery 01/22/2018    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    History of lumbar surgery 01/22/2018    Left sided sciatica 12/02/2014    Osteomyelitis (Multi) 10/18/2013    Other conditions influencing health status     Stroke syndrome    Otitis media, unspecified, bilateral 04/12/2022    Acute bilateral otitis media    Pain in right knee 11/10/2022    Acute pain of right knee    Personal history of other diseases of the digestive system     History of diverticulitis of colon    Personal history of other diseases of the digestive system     History of glossitis    Personal history of other diseases of the musculoskeletal system and connective tissue     History of fibromyositis    Personal history of other diseases of the musculoskeletal system and connective tissue     History of fibromyositis    Personal history of other diseases of the nervous system and sense organs 04/15/2022    History of otitis media    Simple chronic bronchitis (Multi) 02/15/2018    Sjogren syndrome, unspecified (Multi)     Sjogrens syndrome    Unspecified otitis externa, unspecified ear 04/15/2022    Otitis externa      Past Surgical History:   Procedure Laterality Date    CATARACT EXTRACTION  11/15/2012    Cataract Surgery    CATARACT EXTRACTION  04/08/2013    Cataract Surgery    COLECTOMY      8\" colon removed    HYSTERECTOMY  11/15/2012    Hysterectomy    HYSTERECTOMY  " 2013    Hysterectomy    MR HEAD ANGIO WO IV CONTRAST  2023    MR HEAD ANGIO WO IV CONTRAST 2023 GEN MRI    MR NECK ANGIO WO IV CONTRAST  2023    MR NECK ANGIO WO IV CONTRAST 2023 GEN MRI    OTHER SURGICAL HISTORY  2013    Fusion / Refusion Of Vertebrae      Family History   Problem Relation Name Age of Onset    No Known Problems Mother      No Known Problems Father        Social History     Tobacco Use    Smoking status: Former     Current packs/day: 0.00     Average packs/day: 1 pack/day for 20.0 years (20.0 ttl pk-yrs)     Types: Cigarettes     Start date:      Quit date:      Years since quittin.7    Smokeless tobacco: Never   Substance Use Topics    Alcohol use: Never        Current Outpatient Medications:     albuterol 2.5 mg /3 mL (0.083 %) nebulizer solution, Take 3 mL (2.5 mg) by nebulization every 8 hours if needed for wheezing., Disp: 270 mL, Rfl: 1    ALPRAZolam (Xanax) 0.25 mg tablet, Take 1 tablet (0.25 mg) by mouth 3 times a day as needed for anxiety., Disp: 90 tablet, Rfl: 2    hydroCHLOROthiazide (Microzide) 12.5 mg tablet, Take 1 tablet (12.5 mg) by mouth once daily., Disp: 90 tablet, Rfl: 1    ibuprofen 400 mg tablet, TAKE 1 TABLET BY MOUTH EVERY 8  HOURS IF NEEDED FOR MODERATE  PAIN (4-6), Disp: 270 tablet, Rfl: 3    labetalol (Normodyne) 200 mg tablet, TAKE 1 TABLET BY MOUTH TWICE  DAILY, Disp: 180 tablet, Rfl: 3    latanoprost (Xalatan) 0.005 % ophthalmic solution, Latanoprost 0.005 % Ophthalmic Solution  Quantity: 8  Refills: 0      Start : 24-May-2021  Active, Disp: , Rfl:     meclizine (Antivert) 25 mg tablet, Take 1 tablet (25 mg) by mouth 2 times a day as needed for dizziness for up to 90 doses., Disp: 90 tablet, Rfl: 0    metFORMIN (Glucophage) 500 mg tablet, TAKE 1 TABLET BY MOUTH TWICE  DAILY, Disp: 180 tablet, Rfl: 3    omeprazole (PriLOSEC) 40 mg DR capsule, TAKE 1 CAPSULE BY MOUTH ONCE  DAILY IN THE MORNING. TAKE  BEFORE MEALS, Disp: 90  capsule, Rfl: 3    oxygen (O2) therapy, Inhale 3 L/min at 180,000 mL/hr once daily at bedtime. via nasal canula, Disp: , Rfl:     peg 400-propylene glycol (SYSTANE) 0.4-0.3 % drops ophthalmic drops, ophtho, Disp: , Rfl:     simvastatin (Zocor) 20 mg tablet, TAKE 1 TABLET BY MOUTH ONCE  DAILY, Disp: 90 tablet, Rfl: 3    tiZANidine (Zanaflex) 2 mg tablet, Take 1 tablet (2 mg) by mouth every 8 hours if needed for muscle spasms., Disp: 90 tablet, Rfl: 0    citalopram (CeleXA) 20 mg tablet, Take 1 tablet (20 mg) by mouth once daily., Disp: 90 tablet, Rfl: 3    levothyroxine (Synthroid) 50 mcg tablet, Take 1 tablet (50 mcg) by mouth once daily in the morning. Take before meals., Disp: 90 tablet, Rfl: 3    meloxicam (Mobic) 7.5 mg tablet, Take 1 tablet (7.5 mg) by mouth once daily., Disp: 90 tablet, Rfl: 3     Review of Systems  Constitutional: Denies fever  HEENT: Denies ST, earache  CVS: Denies Chest pain  Pulmonary: Denies wheezing, SOB  GI: Denies N/V  : Denies dysuria  Musculoskeletal:  Denies myalgia  Neuro: Denies focal weakness or numbness.  Skin: Denies Rashes.  *Review of Systems is negative unless otherwise mentioned in HPI or ROS above.    Objective   /74   Pulse 58   Temp 36.8 °C (98.2 °F)   Wt 74.2 kg (163 lb 9.6 oz)   SpO2 96%   BMI 30.41 kg/m²  reviewed Body mass index is 30.41 kg/m².     Physical Exam  Constitutional: NAD.  Resting comfortably.  Head: Atraumatic, normocephalic.  ENT: Moist oral mucosa. Nasal mucosa wnl.   Cardiac: Regular rate & rhythm.   Pulmonary: Lungs clear bilat  GI: Soft, Nontender, nondistended.   Musculoskeletal: No peripheral edema.   Skin: No evidence of trauma. No rashes  Psych: Intact judgement and insight.    .Assessment/Plan   Problem List Items Addressed This Visit             ICD-10-CM    Chronic neck pain M54.2, G89.29    Relevant Medications    meloxicam (Mobic) 7.5 mg tablet    DM2 (diabetes mellitus, type 2) (Multi) - Primary E11.9    Relevant Orders     POCT glycosylated hemoglobin (Hb A1C) manually resulted (Completed)    POCT Albumin Random Urine manually resulted (Completed)    Adult hypothyroidism E03.9    Relevant Medications    levothyroxine (Synthroid) 50 mcg tablet    Hypertensive heart disease with heart failure (Multi) I11.0     Continue current care plan          Other Visit Diagnoses         Codes    Anxiety     F41.9    Relevant Medications    citalopram (CeleXA) 20 mg tablet    Chronic midline low back pain without sciatica     M54.50, G89.29    Relevant Medications    meloxicam (Mobic) 7.5 mg tablet

## 2024-09-12 ENCOUNTER — APPOINTMENT (OUTPATIENT)
Dept: PRIMARY CARE | Facility: CLINIC | Age: 84
End: 2024-09-12
Payer: MEDICARE

## 2024-09-12 VITALS
OXYGEN SATURATION: 96 % | HEART RATE: 58 BPM | BODY MASS INDEX: 30.41 KG/M2 | WEIGHT: 163.6 LBS | SYSTOLIC BLOOD PRESSURE: 138 MMHG | DIASTOLIC BLOOD PRESSURE: 74 MMHG | TEMPERATURE: 98.2 F

## 2024-09-12 DIAGNOSIS — I11.0 HYPERTENSIVE HEART DISEASE WITH HEART FAILURE (MULTI): ICD-10-CM

## 2024-09-12 DIAGNOSIS — M54.2 CHRONIC NECK PAIN: ICD-10-CM

## 2024-09-12 DIAGNOSIS — E11.9 TYPE 2 DIABETES MELLITUS WITHOUT COMPLICATION, WITHOUT LONG-TERM CURRENT USE OF INSULIN (MULTI): Primary | ICD-10-CM

## 2024-09-12 DIAGNOSIS — M54.50 CHRONIC MIDLINE LOW BACK PAIN WITHOUT SCIATICA: ICD-10-CM

## 2024-09-12 DIAGNOSIS — F41.9 ANXIETY: ICD-10-CM

## 2024-09-12 DIAGNOSIS — G89.29 CHRONIC MIDLINE LOW BACK PAIN WITHOUT SCIATICA: ICD-10-CM

## 2024-09-12 DIAGNOSIS — E03.9 ADULT HYPOTHYROIDISM: ICD-10-CM

## 2024-09-12 DIAGNOSIS — G89.29 CHRONIC NECK PAIN: ICD-10-CM

## 2024-09-12 LAB
POC ALBUMIN /CREATININE RATIO MANUALLY ENTERED: ABNORMAL UG/MG CREAT
POC HEMOGLOBIN A1C: 7 % (ref 4.2–6.5)
POC URINE ALBUMIN: 80 MG/L
POC URINE CREATININE: 200 MG/DL

## 2024-09-12 PROCEDURE — 1160F RVW MEDS BY RX/DR IN RCRD: CPT | Performed by: PHYSICIAN ASSISTANT

## 2024-09-12 PROCEDURE — 1159F MED LIST DOCD IN RCRD: CPT | Performed by: PHYSICIAN ASSISTANT

## 2024-09-12 PROCEDURE — 83036 HEMOGLOBIN GLYCOSYLATED A1C: CPT | Performed by: PHYSICIAN ASSISTANT

## 2024-09-12 PROCEDURE — 99214 OFFICE O/P EST MOD 30 MIN: CPT | Performed by: PHYSICIAN ASSISTANT

## 2024-09-12 PROCEDURE — 3078F DIAST BP <80 MM HG: CPT | Performed by: PHYSICIAN ASSISTANT

## 2024-09-12 PROCEDURE — 82044 UR ALBUMIN SEMIQUANTITATIVE: CPT | Performed by: PHYSICIAN ASSISTANT

## 2024-09-12 PROCEDURE — 3075F SYST BP GE 130 - 139MM HG: CPT | Performed by: PHYSICIAN ASSISTANT

## 2024-09-12 PROCEDURE — 1036F TOBACCO NON-USER: CPT | Performed by: PHYSICIAN ASSISTANT

## 2024-09-12 RX ORDER — LEVOTHYROXINE SODIUM 50 UG/1
50 TABLET ORAL
Qty: 90 TABLET | Refills: 3 | Status: SHIPPED | OUTPATIENT
Start: 2024-09-12 | End: 2025-09-12

## 2024-09-12 RX ORDER — CITALOPRAM 20 MG/1
20 TABLET, FILM COATED ORAL DAILY
Qty: 90 TABLET | Refills: 3 | Status: SHIPPED | OUTPATIENT
Start: 2024-09-12 | End: 2025-09-12

## 2024-09-12 RX ORDER — CITALOPRAM 20 MG/1
20 TABLET, FILM COATED ORAL DAILY
Qty: 90 TABLET | Refills: 3 | Status: SHIPPED | OUTPATIENT
Start: 2024-09-12 | End: 2024-09-12

## 2024-09-12 RX ORDER — ALPRAZOLAM 0.25 MG/1
0.25 TABLET ORAL 3 TIMES DAILY PRN
Qty: 90 TABLET | Refills: 2 | Status: SHIPPED | OUTPATIENT
Start: 2024-09-12

## 2024-09-12 RX ORDER — MELOXICAM 7.5 MG/1
7.5 TABLET ORAL DAILY
Qty: 90 TABLET | Refills: 3 | Status: SHIPPED | OUTPATIENT
Start: 2024-09-12 | End: 2025-09-12

## 2024-09-12 RX ORDER — LEVOTHYROXINE SODIUM 50 UG/1
50 TABLET ORAL
Qty: 90 TABLET | Refills: 3 | Status: SHIPPED | OUTPATIENT
Start: 2024-09-12 | End: 2024-09-12

## 2024-09-12 ASSESSMENT — ANXIETY QUESTIONNAIRES
5. BEING SO RESTLESS THAT IT IS HARD TO SIT STILL: SEVERAL DAYS
7. FEELING AFRAID AS IF SOMETHING AWFUL MIGHT HAPPEN: SEVERAL DAYS
6. BECOMING EASILY ANNOYED OR IRRITABLE: MORE THAN HALF THE DAYS
IF YOU CHECKED OFF ANY PROBLEMS ON THIS QUESTIONNAIRE, HOW DIFFICULT HAVE THESE PROBLEMS MADE IT FOR YOU TO DO YOUR WORK, TAKE CARE OF THINGS AT HOME, OR GET ALONG WITH OTHER PEOPLE: SOMEWHAT DIFFICULT
1. FEELING NERVOUS, ANXIOUS, OR ON EDGE: NEARLY EVERY DAY
GAD7 TOTAL SCORE: 14
2. NOT BEING ABLE TO STOP OR CONTROL WORRYING: MORE THAN HALF THE DAYS
4. TROUBLE RELAXING: NEARLY EVERY DAY
3. WORRYING TOO MUCH ABOUT DIFFERENT THINGS: MORE THAN HALF THE DAYS

## 2024-09-12 NOTE — PATIENT INSTRUCTIONS
Start taking celexa daily- it will take several weeks before you start feeling any effects.     STOP ibuprofen. Take meloxicam instead.

## 2024-09-16 DIAGNOSIS — J41.0 SIMPLE CHRONIC BRONCHITIS (MULTI): ICD-10-CM

## 2024-09-16 RX ORDER — ALBUTEROL SULFATE 0.83 MG/ML
2.5 SOLUTION RESPIRATORY (INHALATION) EVERY 8 HOURS PRN
Qty: 270 ML | Refills: 1 | Status: SHIPPED | OUTPATIENT
Start: 2024-09-16 | End: 2025-03-15

## 2024-09-18 DIAGNOSIS — R60.0 PEDAL EDEMA: ICD-10-CM

## 2024-09-18 RX ORDER — HYDROCHLOROTHIAZIDE 12.5 MG/1
12.5 TABLET ORAL DAILY
Qty: 90 TABLET | Refills: 3 | Status: SHIPPED | OUTPATIENT
Start: 2024-09-18 | End: 2025-09-18

## 2024-09-18 NOTE — TELEPHONE ENCOUNTER
Patient called and stated that [facility] said that you cancelled her hydrochlorothiazide. I see recently that you had it sent to [facility] in [place]. I advised the patient we can't sent to RX to two different pharmacies at the same time. She said if we could send a small amount to [facility] then when she gets low she will call back and have it go to [facility].

## 2024-10-07 DIAGNOSIS — J41.0 SIMPLE CHRONIC BRONCHITIS (MULTI): ICD-10-CM

## 2024-10-07 RX ORDER — ALBUTEROL SULFATE 0.83 MG/ML
2.5 SOLUTION RESPIRATORY (INHALATION) EVERY 8 HOURS PRN
Qty: 270 ML | Refills: 2 | Status: SHIPPED | OUTPATIENT
Start: 2024-10-07 | End: 2025-04-05

## 2024-10-14 DIAGNOSIS — F41.9 ANXIETY: ICD-10-CM

## 2024-10-15 RX ORDER — ALPRAZOLAM 0.25 MG/1
TABLET ORAL
Qty: 90 TABLET | Refills: 0 | Status: SHIPPED | OUTPATIENT
Start: 2024-10-15

## 2024-11-18 ENCOUNTER — TELEPHONE (OUTPATIENT)
Dept: PRIMARY CARE | Facility: CLINIC | Age: 84
End: 2024-11-18
Payer: MEDICARE

## 2024-11-18 ENCOUNTER — APPOINTMENT (OUTPATIENT)
Dept: CARDIOLOGY | Facility: HOSPITAL | Age: 84
End: 2024-11-18
Payer: MEDICARE

## 2024-11-18 ENCOUNTER — APPOINTMENT (OUTPATIENT)
Dept: RADIOLOGY | Facility: HOSPITAL | Age: 84
End: 2024-11-18
Payer: MEDICARE

## 2024-11-18 ENCOUNTER — HOSPITAL ENCOUNTER (EMERGENCY)
Facility: HOSPITAL | Age: 84
Discharge: HOME | End: 2024-11-18
Attending: EMERGENCY MEDICINE
Payer: MEDICARE

## 2024-11-18 VITALS
HEART RATE: 46 BPM | TEMPERATURE: 97.8 F | OXYGEN SATURATION: 95 % | SYSTOLIC BLOOD PRESSURE: 189 MMHG | HEIGHT: 62 IN | WEIGHT: 160 LBS | DIASTOLIC BLOOD PRESSURE: 86 MMHG | RESPIRATION RATE: 17 BRPM | BODY MASS INDEX: 29.44 KG/M2

## 2024-11-18 DIAGNOSIS — I10 HYPERTENSION, UNSPECIFIED TYPE: Primary | ICD-10-CM

## 2024-11-18 LAB
ALBUMIN SERPL BCP-MCNC: 4.1 G/DL (ref 3.4–5)
ALP SERPL-CCNC: 54 U/L (ref 33–136)
ALT SERPL W P-5'-P-CCNC: 8 U/L (ref 7–45)
ANION GAP SERPL CALC-SCNC: 13 MMOL/L (ref 10–20)
AST SERPL W P-5'-P-CCNC: 12 U/L (ref 9–39)
BASOPHILS # BLD AUTO: 0.06 X10*3/UL (ref 0–0.1)
BASOPHILS NFR BLD AUTO: 0.7 %
BILIRUB SERPL-MCNC: 0.4 MG/DL (ref 0–1.2)
BUN SERPL-MCNC: 16 MG/DL (ref 6–23)
CALCIUM SERPL-MCNC: 9.4 MG/DL (ref 8.6–10.3)
CARDIAC TROPONIN I PNL SERPL HS: 3 NG/L (ref 0–13)
CHLORIDE SERPL-SCNC: 105 MMOL/L (ref 98–107)
CO2 SERPL-SCNC: 27 MMOL/L (ref 21–32)
CREAT SERPL-MCNC: 0.61 MG/DL (ref 0.5–1.05)
EGFRCR SERPLBLD CKD-EPI 2021: 89 ML/MIN/1.73M*2
EOSINOPHIL # BLD AUTO: 0.19 X10*3/UL (ref 0–0.4)
EOSINOPHIL NFR BLD AUTO: 2.3 %
ERYTHROCYTE [DISTWIDTH] IN BLOOD BY AUTOMATED COUNT: 13.4 % (ref 11.5–14.5)
GLUCOSE SERPL-MCNC: 136 MG/DL (ref 74–99)
HCT VFR BLD AUTO: 40.8 % (ref 36–46)
HGB BLD-MCNC: 13.1 G/DL (ref 12–16)
IMM GRANULOCYTES # BLD AUTO: 0.03 X10*3/UL (ref 0–0.5)
IMM GRANULOCYTES NFR BLD AUTO: 0.4 % (ref 0–0.9)
LYMPHOCYTES # BLD AUTO: 1.87 X10*3/UL (ref 0.8–3)
LYMPHOCYTES NFR BLD AUTO: 22.3 %
MCH RBC QN AUTO: 29.6 PG (ref 26–34)
MCHC RBC AUTO-ENTMCNC: 32.1 G/DL (ref 32–36)
MCV RBC AUTO: 92 FL (ref 80–100)
MONOCYTES # BLD AUTO: 0.83 X10*3/UL (ref 0.05–0.8)
MONOCYTES NFR BLD AUTO: 9.9 %
NEUTROPHILS # BLD AUTO: 5.39 X10*3/UL (ref 1.6–5.5)
NEUTROPHILS NFR BLD AUTO: 64.4 %
NRBC BLD-RTO: 0 /100 WBCS (ref 0–0)
PLATELET # BLD AUTO: 261 X10*3/UL (ref 150–450)
POTASSIUM SERPL-SCNC: 3.8 MMOL/L (ref 3.5–5.3)
PROT SERPL-MCNC: 6.8 G/DL (ref 6.4–8.2)
RBC # BLD AUTO: 4.43 X10*6/UL (ref 4–5.2)
SODIUM SERPL-SCNC: 141 MMOL/L (ref 136–145)
WBC # BLD AUTO: 8.4 X10*3/UL (ref 4.4–11.3)

## 2024-11-18 PROCEDURE — 84075 ASSAY ALKALINE PHOSPHATASE: CPT | Performed by: EMERGENCY MEDICINE

## 2024-11-18 PROCEDURE — 71045 X-RAY EXAM CHEST 1 VIEW: CPT | Mod: FOREIGN READ | Performed by: RADIOLOGY

## 2024-11-18 PROCEDURE — 93005 ELECTROCARDIOGRAM TRACING: CPT

## 2024-11-18 PROCEDURE — 85025 COMPLETE CBC W/AUTO DIFF WBC: CPT | Performed by: EMERGENCY MEDICINE

## 2024-11-18 PROCEDURE — 99283 EMERGENCY DEPT VISIT LOW MDM: CPT | Mod: 25

## 2024-11-18 PROCEDURE — 36415 COLL VENOUS BLD VENIPUNCTURE: CPT | Performed by: EMERGENCY MEDICINE

## 2024-11-18 PROCEDURE — 71045 X-RAY EXAM CHEST 1 VIEW: CPT

## 2024-11-18 PROCEDURE — 84484 ASSAY OF TROPONIN QUANT: CPT | Performed by: EMERGENCY MEDICINE

## 2024-11-18 ASSESSMENT — COLUMBIA-SUICIDE SEVERITY RATING SCALE - C-SSRS
1. IN THE PAST MONTH, HAVE YOU WISHED YOU WERE DEAD OR WISHED YOU COULD GO TO SLEEP AND NOT WAKE UP?: NO
6. HAVE YOU EVER DONE ANYTHING, STARTED TO DO ANYTHING, OR PREPARED TO DO ANYTHING TO END YOUR LIFE?: NO
2. HAVE YOU ACTUALLY HAD ANY THOUGHTS OF KILLING YOURSELF?: NO

## 2024-11-18 ASSESSMENT — PAIN DESCRIPTION - FREQUENCY: FREQUENCY: CONSTANT/CONTINUOUS

## 2024-11-18 ASSESSMENT — PAIN SCALES - GENERAL: PAINLEVEL_OUTOF10: 7

## 2024-11-18 ASSESSMENT — PAIN DESCRIPTION - ONSET: ONSET: ONGOING

## 2024-11-18 ASSESSMENT — PAIN DESCRIPTION - PAIN TYPE: TYPE: CHRONIC PAIN

## 2024-11-18 ASSESSMENT — PAIN - FUNCTIONAL ASSESSMENT: PAIN_FUNCTIONAL_ASSESSMENT: 0-10

## 2024-11-18 ASSESSMENT — PAIN DESCRIPTION - LOCATION: LOCATION: OTHER (COMMENT)

## 2024-11-18 NOTE — ED TRIAGE NOTES
"Pt from home with her  to the ED with c/o High blood pressure. Pt states she took it at home with her old blood pressure machine and she states it was 200 systolic. Pt is very anxious as she had a TIA 40 years ago. EKG was done, pt in cardiac monitor, pt denies any CP/SOB. Pt does states she has pain when she takes a deep breath in that has been going on for \"ages\". Pt states she has chronic back pain.  "

## 2024-11-18 NOTE — TELEPHONE ENCOUNTER
SHERIN requesting an appointment sooner than 12-12-24 due to her ER visit today.    Called out to patient 11/19/2024, she is seeing cardiology tomorrow, will review note and schedule from there.

## 2024-11-20 ENCOUNTER — OFFICE VISIT (OUTPATIENT)
Dept: CARDIOLOGY | Facility: HOSPITAL | Age: 84
End: 2024-11-20
Payer: MEDICARE

## 2024-11-20 VITALS
OXYGEN SATURATION: 95 % | WEIGHT: 161.38 LBS | BODY MASS INDEX: 29.52 KG/M2 | HEART RATE: 69 BPM | SYSTOLIC BLOOD PRESSURE: 185 MMHG | DIASTOLIC BLOOD PRESSURE: 102 MMHG

## 2024-11-20 DIAGNOSIS — F41.9 ANXIETY: ICD-10-CM

## 2024-11-20 DIAGNOSIS — R60.0 PEDAL EDEMA: ICD-10-CM

## 2024-11-20 DIAGNOSIS — Z79.1 NSAID LONG-TERM USE: ICD-10-CM

## 2024-11-20 DIAGNOSIS — I10 HYPERTENSION, UNSPECIFIED TYPE: Primary | ICD-10-CM

## 2024-11-20 LAB
ATRIAL RATE: 67 BPM
P AXIS: 76 DEGREES
P OFFSET: 161 MS
P ONSET: 107 MS
PR INTERVAL: 204 MS
Q ONSET: 209 MS
QRS COUNT: 11 BEATS
QRS DURATION: 92 MS
QT INTERVAL: 420 MS
QTC CALCULATION(BAZETT): 443 MS
QTC FREDERICIA: 436 MS
R AXIS: -52 DEGREES
T AXIS: 53 DEGREES
T OFFSET: 419 MS
VENTRICULAR RATE: 67 BPM

## 2024-11-20 PROCEDURE — 3080F DIAST BP >= 90 MM HG: CPT | Performed by: STUDENT IN AN ORGANIZED HEALTH CARE EDUCATION/TRAINING PROGRAM

## 2024-11-20 PROCEDURE — 99204 OFFICE O/P NEW MOD 45 MIN: CPT | Performed by: STUDENT IN AN ORGANIZED HEALTH CARE EDUCATION/TRAINING PROGRAM

## 2024-11-20 PROCEDURE — 1159F MED LIST DOCD IN RCRD: CPT | Performed by: STUDENT IN AN ORGANIZED HEALTH CARE EDUCATION/TRAINING PROGRAM

## 2024-11-20 PROCEDURE — 3077F SYST BP >= 140 MM HG: CPT | Performed by: STUDENT IN AN ORGANIZED HEALTH CARE EDUCATION/TRAINING PROGRAM

## 2024-11-20 PROCEDURE — 99214 OFFICE O/P EST MOD 30 MIN: CPT | Performed by: STUDENT IN AN ORGANIZED HEALTH CARE EDUCATION/TRAINING PROGRAM

## 2024-11-20 RX ORDER — ALPRAZOLAM 0.25 MG/1
0.25 TABLET ORAL 3 TIMES DAILY PRN
Qty: 90 TABLET | Refills: 0 | Status: SHIPPED | OUTPATIENT
Start: 2024-11-20

## 2024-11-20 RX ORDER — AMLODIPINE BESYLATE 5 MG/1
5 TABLET ORAL DAILY
Qty: 30 TABLET | Refills: 0 | Status: SHIPPED | OUTPATIENT
Start: 2024-11-20 | End: 2024-12-20

## 2024-11-20 RX ORDER — HYDROCHLOROTHIAZIDE 12.5 MG/1
12.5 TABLET ORAL DAILY
Qty: 90 TABLET | Refills: 3 | Status: SHIPPED | OUTPATIENT
Start: 2024-11-20 | End: 2025-11-20

## 2024-11-20 NOTE — ED PROVIDER NOTES
"HPI   Chief Complaint   Patient presents with    Hypertension       HPI  Patient is an 84-year-old female presenting to the ED today for concerns of hypertension.  Patient explains that she has a history of hypertension for which she takes labetalol.  She notes that over the past 3 to 4 days, her blood pressure machine has been reading systolic blood pressure in the 180s-190s.  As this has not improved over the past 3 days, she presents to the ED today for further evaluation.  Patient admits that she is very anxious and is a \"nervous wreck.\"  She notes that this is normal for her and that she has Xanax prescribed for when she feels this way.  She otherwise denies any chest pain or shortness of breath.  She states that she tried to call her PCP but they were not any at this morning, so she came to the ED instead.        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    History of lumbar surgery 01/22/2018    Left sided sciatica 12/02/2014    Osteomyelitis 10/18/2013    Other conditions influencing health status     Stroke syndrome    Otitis media, unspecified, bilateral 04/12/2022    Acute bilateral otitis media    Pain in right knee 11/10/2022    Acute pain of right knee    Personal history of other diseases of the digestive system     History of diverticulitis of colon    Personal history of other diseases of the digestive system     History of glossitis    Personal history of other diseases of the musculoskeletal system and connective tissue     History of fibromyositis    Personal history of other diseases of the musculoskeletal system and " "connective tissue     History of fibromyositis    Personal history of other diseases of the nervous system and sense organs 04/15/2022    History of otitis media    Simple chronic bronchitis (Multi) 02/15/2018    Sjogren syndrome, unspecified (Multi)     Sjogrens syndrome    Unspecified otitis externa, unspecified ear 04/15/2022    Otitis externa     Past Surgical History:   Procedure Laterality Date    CATARACT EXTRACTION  11/15/2012    Cataract Surgery    CATARACT EXTRACTION  2013    Cataract Surgery    COLECTOMY      8\" colon removed    HYSTERECTOMY  11/15/2012    Hysterectomy    HYSTERECTOMY  2013    Hysterectomy    MR HEAD ANGIO WO IV CONTRAST  2023    MR HEAD ANGIO WO IV CONTRAST 2023 GEN MRI    MR NECK ANGIO WO IV CONTRAST  2023    MR NECK ANGIO WO IV CONTRAST 2023 GEN MRI    OTHER SURGICAL HISTORY  2013    Fusion / Refusion Of Vertebrae     Family History   Problem Relation Name Age of Onset    No Known Problems Mother      No Known Problems Father       Social History     Tobacco Use    Smoking status: Former     Current packs/day: 0.00     Average packs/day: 1 pack/day for 20.0 years (20.0 ttl pk-yrs)     Types: Cigarettes     Start date:      Quit date:      Years since quittin.9    Smokeless tobacco: Never   Vaping Use    Vaping status: Never Used   Substance Use Topics    Alcohol use: Never    Drug use: Never       Physical Exam   ED Triage Vitals [24 0829]   Temperature Heart Rate Respirations BP   36.6 °C (97.8 °F) 76 17 (!) 170/92      Pulse Ox Temp Source Heart Rate Source Patient Position   95 % Oral Monitor Lying      BP Location FiO2 (%)     Right arm --       Physical Exam  Vitals and nursing note reviewed.   Constitutional:       General: She is not in acute distress.  HENT:      Head: Normocephalic.      Mouth/Throat:      Mouth: Mucous membranes are moist.   Eyes:      Extraocular Movements: Extraocular movements intact.      " Conjunctiva/sclera: Conjunctivae normal.   Cardiovascular:      Rate and Rhythm: Normal rate and regular rhythm.      Pulses: Normal pulses.   Pulmonary:      Effort: Pulmonary effort is normal. No respiratory distress.      Breath sounds: Normal breath sounds. No wheezing.   Abdominal:      General: There is no distension.      Palpations: Abdomen is soft.      Tenderness: There is no abdominal tenderness.   Musculoskeletal:         General: No swelling.      Cervical back: Neck supple.   Skin:     General: Skin is warm and dry.      Capillary Refill: Capillary refill takes less than 2 seconds.   Neurological:      General: No focal deficit present.      Mental Status: She is alert. Mental status is at baseline.           ED Course & MDM   ED Course as of 11/20/24 0741 Mon Nov 18, 2024   0957 EKG obtained at 838, interpreted by myself.  Normal sinus rhythm with a ventricular rate of 67, left axis deviation, normal intervals, with no acute ischemic changes [VT]      ED Course User Index  [VT] Fallon VELASQUEZ MD         Diagnoses as of 11/20/24 0741   Hypertension, unspecified type             No data recorded     Naima Coma Scale Score: 15 (11/18/24 0839 : Taylor Estrada RN)                         Medical Decision Making  Patient was seen and evaluated for asymptomatic hypertension.  On arrival, patient's blood pressure is 170/92.  The remainder of her vital signs are otherwise unremarkable.  Patient does admit to taking her morning dose of labetalol prior to arrival.  Additional lab work and imaging are ordered at this time for further evaluation.  CBC is unremarkable.  CMP is unremarkable.  High-sensitivity troponin is normal at 3.  XR chest 1 view   Final Result   No acute cardiopulmonary abnormality.   Signed by Pedro Sumner MD        On reevaluation, patient remains asymptomatic. Patient was informed of their lab and imaging results, and all questions and concerns were answered. Discharge planning  with close outpatient follow-up was discussed at this time, to which the patient was agreeable.  Patient is provided with a referral to outpatient cardiology, and was able to schedule an upcoming appointment for 2 days from now. Strict return precautions were given, and patient was discharged home in stable condition.    Procedure  Procedures     Fallon VELASQUEZ MD  11/20/24 0748

## 2024-11-20 NOTE — PROGRESS NOTES
Primary Care Physician: Yessenia Stephen PA-C   Date of Visit: 2024  2:00 PM EST  Type of Visit: new      Chief Complaint:  No chief complaint on file.       HPI  Linsey Donis 84 y.o. female  with hx of HTN, type II DM, hypothyroidism and anxiety referred for HTN     She went to ED with , did not have symptoms    She has low HR   No dizziness or syncope    Limited functional capacity   Uses a walker for assistance    She is in constant back and neck dose  She takes 3-4 tabs of ibuprofen     No chest pain  No sob   No le edema       Review of Systems   Review of Systems   12 points review of systems are negative expect for the above    Social History:  Social History     Socioeconomic History    Marital status:      Spouse name: Not on file    Number of children: Not on file    Years of education: Not on file    Highest education level: Not on file   Occupational History    Not on file   Tobacco Use    Smoking status: Former     Current packs/day: 0.00     Average packs/day: 1 pack/day for 20.0 years (20.0 ttl pk-yrs)     Types: Cigarettes     Start date:      Quit date:      Years since quittin.9    Smokeless tobacco: Never   Vaping Use    Vaping status: Never Used   Substance and Sexual Activity    Alcohol use: Never    Drug use: Never    Sexual activity: Not Currently   Other Topics Concern    Not on file   Social History Narrative    Not on file     Social Drivers of Health     Financial Resource Strain: Not on file   Food Insecurity: Not on file   Transportation Needs: Not on file   Physical Activity: Not on file   Stress: Not on file   Social Connections: Not on file   Intimate Partner Violence: Not on file   Housing Stability: Not on file        Past Medical History:  Past Medical History:   Diagnosis Date    Acute bilateral low back pain with right-sided sciatica 06/15/2018    COPD with exacerbation (Multi) 02/15/2018    Deep vein thrombosis (DVT) of other vein of lower  "extremity, unspecified chronicity, unspecified laterality (Multi) 04/27/2023    Esophagitis 02/12/2014    History of cervical spinal surgery 01/22/2018    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    History of lumbar surgery 01/22/2018    Left sided sciatica 12/02/2014    Osteomyelitis 10/18/2013    Other conditions influencing health status     Stroke syndrome    Otitis media, unspecified, bilateral 04/12/2022    Acute bilateral otitis media    Pain in right knee 11/10/2022    Acute pain of right knee    Personal history of other diseases of the digestive system     History of diverticulitis of colon    Personal history of other diseases of the digestive system     History of glossitis    Personal history of other diseases of the musculoskeletal system and connective tissue     History of fibromyositis    Personal history of other diseases of the musculoskeletal system and connective tissue     History of fibromyositis    Personal history of other diseases of the nervous system and sense organs 04/15/2022    History of otitis media    Simple chronic bronchitis (Multi) 02/15/2018    Sjogren syndrome, unspecified (Multi)     Sjogrens syndrome    Unspecified otitis externa, unspecified ear 04/15/2022    Otitis externa       Past Surgical History:  Past Surgical History:   Procedure Laterality Date    CATARACT EXTRACTION  11/15/2012    Cataract Surgery    CATARACT EXTRACTION  04/08/2013    Cataract Surgery    COLECTOMY      8\" colon removed    HYSTERECTOMY  11/15/2012    Hysterectomy    HYSTERECTOMY  04/08/2013    Hysterectomy    MR HEAD ANGIO WO IV CONTRAST  06/19/2023    MR HEAD ANGIO WO IV CONTRAST 6/19/2023 GEN MRI    MR NECK ANGIO WO IV CONTRAST  06/19/2023    MR NECK ANGIO WO IV CONTRAST 6/19/2023 GEN MRI    OTHER SURGICAL HISTORY  04/08/2013    Fusion / Refusion Of Vertebrae       Family History:  Family History   Problem Relation Name Age of Onset    No Known Problems Mother      No Known Problems Father   " "       Objective:       11/18/2024     8:29 AM 11/18/2024     9:00 AM 11/18/2024     9:30 AM 11/18/2024    10:00 AM 11/18/2024    10:30 AM 11/18/2024    11:14 AM 11/20/2024     1:55 PM   Vitals   Systolic 170 173 164 178 180 189 185   Diastolic 92 90 71 75 70 86 102   BP Location Right arm         Heart Rate 76 69 45 42 41 46 69   Temp 36.6 °C (97.8 °F)         Resp 17 19 20 16 18 17    Height 1.575 m (5' 2\")         Weight (lb) 160      161.38   BMI 29.26 kg/m2      29.52 kg/m2   BSA (m2) 1.78 m2      1.79 m2   Visit Report       Report      Constitutional:       Appearance: Healthy appearance. Not in distress.   Neck:      Vascular: No JVR. JVD normal.   Pulmonary:      Effort: Pulmonary effort is normal.      Breath sounds: Normal breath sounds. No wheezing. No rhonchi. No rales.   Chest:      Chest wall: Not tender to palpatation.   Cardiovascular:      Normal rate. Regular rhythm. Normal S1. Normal S2.       Murmurs: There is no murmur.      No gallop.  N No rub.   Pulses:     Intact distal pulses.   Edema:     Peripheral edema absent.   Abdominal:      General: Bowel sounds are normal.      Palpations: Abdomen is soft.      Tenderness: There is no abdominal tenderness.   Musculoskeletal: Normal range of motion.         General: No tenderness.   Skin:     General: Skin is warm and dry.   Neurological:      General: No focal deficit present.      Mental Status: Alert and oriented to person, place and time.     Allergies:  Allergies   Allergen Reactions    Penicillins Rash       Medications:  Current Outpatient Medications   Medication Instructions    albuterol 2.5 mg, nebulization, Every 8 hours PRN    ALPRAZolam (XANAX) 0.25 mg, oral, 3 times daily PRN    citalopram (CELEXA) 20 mg, oral, Daily    hydroCHLOROthiazide (MICROZIDE) 12.5 mg, oral, Daily    ibuprofen 400 mg tablet TAKE 1 TABLET BY MOUTH EVERY 8  HOURS IF NEEDED FOR MODERATE  PAIN (4-6)    labetalol (NORMODYNE) 200 mg, oral, 2 times daily    " latanoprost (Xalatan) 0.005 % ophthalmic solution Latanoprost 0.005 % Ophthalmic Solution   Quantity: 8  Refills: 0        Start : 24-May-2021   Active    levothyroxine (SYNTHROID) 50 mcg, oral, Daily before breakfast    meclizine (ANTIVERT) 25 mg, oral, 2 times daily PRN    meloxicam (MOBIC) 7.5 mg, oral, Daily    metFORMIN (GLUCOPHAGE) 500 mg, oral, 2 times daily    omeprazole (PRILOSEC) 40 mg, oral, Daily before breakfast    oxygen DME/Hospice (O2) 3 L/min, Nightly    peg 400-propylene glycol (SYSTANE) 0.4-0.3 % drops ophthalmic drops ophtho    simvastatin (ZOCOR) 20 mg, oral, Daily    tiZANidine (ZANAFLEX) 2 mg, oral, Every 8 hours PRN        Labs and Imaging:     Lab Results   Component Value Date    WBC 8.4 11/18/2024    HGB 13.1 11/18/2024    HCT 40.8 11/18/2024     11/18/2024    CHOL 151 08/22/2024    TRIG 135 08/22/2024    HDL 49.7 08/22/2024    ALT 8 11/18/2024    AST 12 11/18/2024     11/18/2024    K 3.8 11/18/2024     11/18/2024    CREATININE 0.61 11/18/2024    BUN 16 11/18/2024    CO2 27 11/18/2024    TSH 2.10 08/22/2024    INR 1.1 06/19/2023    HGBA1C 7.0 (A) 09/12/2024         Echocardiogram: No results found for this or any previous visit from the past 1825 days.    Stress Testing: No results found for this or any previous visit from the past 1825 days.    Cardiac Catheterization: No results found for this or any previous visit from the past 1825 days.    Cardiac Scoring: No results found for this or any previous visit from the past 1825 days.    AAA : No results found for this or any previous visit from the past 1825 days.    OTHER: No results found for this or any previous visit from the past 1825 days.      The ASCVD Risk score (Osmany DK, et al., 2019) failed to calculate for the following reasons:    The 2019 ASCVD risk score is only valid for ages 40 to 79    Risk score cannot be calculated because patient has a medical history suggesting prior/existing ASCVD      Assessment/Plan:   1. Hypertension, unspecified type  Referral to Cardiology         Linsey Donis 84 y.o. female with hx of HTN, type II DM, hypothyroidism, spinal stenosis, and anxiety referred for HTN   Asymptomatic from cardiac  stand point   Chronic NSAID user, in constant pain     EKG with NSR and LAFB    Plan  Cut labetalol into half due to low HR noted during prior pulse checks   Increase hydrochlorothiazide to 25 mg every day    I asked her to buy K suppl OTC   Add amlodipine 5 mg every day she can start after 3-5 days, will give a month supply, she will get refills, if needed, by her PCP   Continue statin    Limited NSAIDs   Refer to pain management   She will continue to follow up with her PCP. Recommend 2-4 weeks fu with her PCP     Thank you for the referral    Time Spent: I spent  minutes reviewing medical testing, obtaining medical history and counselling and educating on diagnosis and documenting clinical encounter.         ____________________________________________________________  Wendi Guy MD   of Medicine  Division of Cardiovascular Medicine   [facility] [place]  [facility]

## 2024-12-12 ENCOUNTER — APPOINTMENT (OUTPATIENT)
Dept: PRIMARY CARE | Facility: CLINIC | Age: 84
End: 2024-12-12
Payer: MEDICARE

## 2024-12-12 VITALS — SYSTOLIC BLOOD PRESSURE: 170 MMHG | HEART RATE: 49 BPM | DIASTOLIC BLOOD PRESSURE: 73 MMHG

## 2024-12-12 DIAGNOSIS — I10 PRIMARY HYPERTENSION: Primary | ICD-10-CM

## 2024-12-12 DIAGNOSIS — F41.9 ANXIETY: ICD-10-CM

## 2024-12-12 DIAGNOSIS — I10 HYPERTENSION, UNSPECIFIED TYPE: ICD-10-CM

## 2024-12-12 DIAGNOSIS — R60.0 PEDAL EDEMA: ICD-10-CM

## 2024-12-12 PROCEDURE — 3077F SYST BP >= 140 MM HG: CPT | Performed by: PHYSICIAN ASSISTANT

## 2024-12-12 PROCEDURE — 1160F RVW MEDS BY RX/DR IN RCRD: CPT | Performed by: PHYSICIAN ASSISTANT

## 2024-12-12 PROCEDURE — 1159F MED LIST DOCD IN RCRD: CPT | Performed by: PHYSICIAN ASSISTANT

## 2024-12-12 PROCEDURE — 3078F DIAST BP <80 MM HG: CPT | Performed by: PHYSICIAN ASSISTANT

## 2024-12-12 PROCEDURE — 1036F TOBACCO NON-USER: CPT | Performed by: PHYSICIAN ASSISTANT

## 2024-12-12 PROCEDURE — 99443 PR PHYS/QHP TELEPHONE EVALUATION 21-30 MIN: CPT | Performed by: PHYSICIAN ASSISTANT

## 2024-12-12 RX ORDER — AMLODIPINE BESYLATE 5 MG/1
5 TABLET ORAL DAILY
Qty: 90 TABLET | Refills: 0 | Status: SHIPPED | OUTPATIENT
Start: 2024-12-12

## 2024-12-12 RX ORDER — HYDROCHLOROTHIAZIDE 25 MG/1
25 TABLET ORAL DAILY
Qty: 14 TABLET | Refills: 0 | Status: SHIPPED | OUTPATIENT
Start: 2024-12-12

## 2024-12-12 RX ORDER — ALPRAZOLAM 0.25 MG/1
0.25 TABLET ORAL 3 TIMES DAILY PRN
Qty: 90 TABLET | Refills: 2 | Status: SHIPPED | OUTPATIENT
Start: 2024-12-12

## 2024-12-12 RX ORDER — METOPROLOL SUCCINATE 50 MG/1
50 TABLET, EXTENDED RELEASE ORAL DAILY
Qty: 30 TABLET | Refills: 0 | Status: SHIPPED | OUTPATIENT
Start: 2024-12-12

## 2024-12-12 RX ORDER — AMLODIPINE BESYLATE 5 MG/1
5 TABLET ORAL DAILY
Qty: 14 TABLET | Refills: 0 | Status: SHIPPED | OUTPATIENT
Start: 2024-12-12 | End: 2024-12-26

## 2024-12-12 RX ORDER — HYDROCHLOROTHIAZIDE 25 MG/1
25 TABLET ORAL DAILY
Qty: 90 TABLET | Refills: 0 | Status: SHIPPED | OUTPATIENT
Start: 2024-12-12

## 2024-12-12 NOTE — PROGRESS NOTES
Subjective     HPI   Linsey Donis is a 84 y.o. year old female patient with presenting to clinic with concern for   Chief Complaint   Patient presents with    Follow-up       Refill xanax qty 60 x 30 days, anxiety has been more severe but xanax helps.    HTN  Dr Guy 11/20/24   -amlodipine 5  -Hydrochlorothiazide 25  Change to metoprolol 50  BP Readings from Last 5 Encounters:   12/12/24 170/73   11/20/24 (!) 185/102   11/18/24 (!) 189/86   09/12/24 138/74   06/12/24 154/80     HLD  -simvastatin    Hypothyroidism  -levothyroxine 50    DM2  -metformin bid  Lab Results   Component Value Date    HGBA1C 7.0 (A) 09/12/2024   ASA- recommended  Statin-simvastatin  ARB/ACEI, none  Optho- annually  Regular foot checks    GERD  -omeprazole 40    Chronic pain  -mobic 7.5 every day NOT TAKING IBUPROFEN OR ALEVE  -tizanidine  Neck and back and back pain have been more severe. Ibuprofen is not enough. She has been to pain management and injections haven't helped. She not a surgical candidate for neck, knees or back. She will not go back to pain management because it has not helped her in the past.     Anxiety  -celexa 20   -xanax bid    CSA 9/12/24  Reduction of anxiety, control of anxiety attacks    OARRS:  Yessenia Stephen PA-C on 12/12/2024 12:01 PM  I have personally reviewed the OARRS report for Linsey Donis. I have considered the risks of abuse, dependence, addiction and diversion    Is the patient prescribed a combination of a benzodiazepine and opioid?  No    Last Urine Drug Screen / ordered today: No  Recent Results (from the past 8760 hours)   Confirmation Opiate/Opioid/Benzo Prescription Compliance    Collection Time: 06/12/24  8:48 AM   Result Value Ref Range    Clonazepam <25 <25 ng/mL    7-Aminoclonazepam <25 <25 ng/mL    Alprazolam 135 (H) <25 ng/mL    Alpha-Hydroxyalprazolam 225 (H) <25 ng/mL    Midazolam <25 <25 ng/mL    Alpha-Hydroxymidazolam <25 <25 ng/mL    Chlordiazepoxide <25 <25 ng/mL     Diazepam <25 <25 ng/mL    Nordiazepam <25 <25 ng/mL    Temazepam <25 <25 ng/mL    Oxazepam <25 <25 ng/mL    Lorazepam <25 <25 ng/mL    Methadone <25 <25 ng/mL    EDDP <25 <25 ng/mL    6-Acetylmorphine <25 <25 ng/mL    Codeine <50 <50 ng/mL    Hydrocodone <25 <25 ng/mL    Hydromorphone <25 <25 ng/mL    Morphine  <50 <50 ng/mL    Norhydrocodone <25 <25 ng/mL    Noroxycodone <25 <25 ng/mL    Oxycodone <25 <25 ng/mL    Oxymorphone <25 <25 ng/mL    Fentanyl <2.5 <2.5 ng/mL    Norfentanyl <2.5 <2.5 ng/mL    Tramadol <50 <50 ng/mL    O-Desmethyltramadol <50 <50 ng/mL    Zolpidem <25 <25 ng/mL    Zolpidem Metabolite (ZCA) <25 <25 ng/mL   Screen Opiate/Opioid/Benzo Prescription Compliance    Collection Time: 06/12/24  8:48 AM   Result Value Ref Range    Creatinine, Urine Random 188.6 20.0 - 320.0 mg/dL    Amphetamine Screen, Urine Presumptive Negative Presumptive Negative    Barbiturate Screen, Urine Presumptive Negative Presumptive Negative    Cannabinoid Screen, Urine Presumptive Negative Presumptive Negative    Cocaine Metabolite Screen, Urine Presumptive Negative Presumptive Negative    PCP Screen, Urine Presumptive Negative Presumptive Negative     Results are as expected.       Controlled Substance Agreement:  Date of the Last Agreement: 9/12/24  Reviewed Controlled Substance Agreement including but not limited to the benefits, risks, and alternatives to treatment with a Controlled Substance medication(s).    Benzodiazepines:  What is the patient's goal of therapy? Reduction of anxiety, control of anxiety attacks  Is this being achieved with current treatment? yes    AVA-7: 7      Activities of Daily Living:   Is your overall impression that this patient is benefiting (symptom reduction outweighs side effects) from benzodiazepine therapy? Yes     1. Physical Functioning: Better  2. Family Relationship: Same  3. Social Relationship: Better  4. Mood: Better  5. Sleep Patterns: Better  6. Overall Function: Better  Patient  Active Problem List   Diagnosis    Allergic rhinitis    Anxiety disorder    Chronic fatigue    Chronic neck pain    Disorder of jaw    Dizziness    DM2 (diabetes mellitus, type 2) (Multi)    Dyslipidemia    Adult hypothyroidism    Enlarged thyroid    GERD (gastroesophageal reflux disease)    Gout    Hearing loss    HTN (hypertension)    Insomnia    Polyarthralgia    Vitamin D deficiency    Sjogren's syndrome with keratoconjunctivitis sicca (Multi)    Cervical radiculopathy    DDD (degenerative disc disease), lumbosacral    Edema of both legs    History of stroke    Knee pain, bilateral    Obstructive sleep apnea syndrome    Postlaminectomy syndrome, lumbar region    Temporomandibular joint pain dysfunction syndrome    Stroke (Multi)    Urine test positive for microalbuminuria    Chronic pain syndrome    Sjogren's syndrome    Mixed simple and mucopurulent chronic bronchitis (Multi)    Controlled substance agreement signed    Constipation    Hypertensive heart disease with heart failure       Past Medical History:   Diagnosis Date    Acute bilateral low back pain with right-sided sciatica 06/15/2018    COPD with exacerbation (Multi) 02/15/2018    Deep vein thrombosis (DVT) of other vein of lower extremity, unspecified chronicity, unspecified laterality (Multi) 04/27/2023    Esophagitis 02/12/2014    History of cervical spinal surgery 01/22/2018    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    History of lumbar surgery 01/22/2018    Left sided sciatica 12/02/2014    Osteomyelitis 10/18/2013    Other conditions influencing health status     Stroke syndrome    Otitis media, unspecified, bilateral 04/12/2022    Acute bilateral otitis media    Pain in right knee 11/10/2022    Acute pain of right knee    Personal history of other diseases of the digestive system     History of diverticulitis of colon    Personal history of other diseases of the digestive system     History of glossitis    Personal history of other  "diseases of the musculoskeletal system and connective tissue     History of fibromyositis    Personal history of other diseases of the musculoskeletal system and connective tissue     History of fibromyositis    Personal history of other diseases of the nervous system and sense organs 04/15/2022    History of otitis media    Simple chronic bronchitis (Multi) 02/15/2018    Sjogren syndrome, unspecified (Multi)     Sjogrens syndrome    Unspecified otitis externa, unspecified ear 04/15/2022    Otitis externa      Past Surgical History:   Procedure Laterality Date    CATARACT EXTRACTION  11/15/2012    Cataract Surgery    CATARACT EXTRACTION  2013    Cataract Surgery    COLECTOMY      8\" colon removed    HYSTERECTOMY  11/15/2012    Hysterectomy    HYSTERECTOMY  2013    Hysterectomy    MR HEAD ANGIO WO IV CONTRAST  2023    MR HEAD ANGIO WO IV CONTRAST 2023 GEN MRI    MR NECK ANGIO WO IV CONTRAST  2023    MR NECK ANGIO WO IV CONTRAST 2023 GEN MRI    OTHER SURGICAL HISTORY  2013    Fusion / Refusion Of Vertebrae      Family History   Problem Relation Name Age of Onset    No Known Problems Mother      No Known Problems Father        Social History     Tobacco Use    Smoking status: Former     Current packs/day: 0.00     Average packs/day: 1 pack/day for 20.0 years (20.0 ttl pk-yrs)     Types: Cigarettes     Start date:      Quit date:      Years since quittin.9    Smokeless tobacco: Never   Substance Use Topics    Alcohol use: Never        Current Outpatient Medications:     albuterol 2.5 mg /3 mL (0.083 %) nebulizer solution, Take 3 mL (2.5 mg) by nebulization every 8 hours if needed for wheezing., Disp: 270 mL, Rfl: 2    ALPRAZolam (Xanax) 0.25 mg tablet, Take 1 tablet (0.25 mg) by mouth 3 times a day as needed for anxiety., Disp: 90 tablet, Rfl: 2    amLODIPine (Norvasc) 5 mg tablet, Take 1 tablet (5 mg) by mouth once daily., Disp: 90 tablet, Rfl: 0    amLODIPine " (Norvasc) 5 mg tablet, Take 1 tablet (5 mg) by mouth once daily for 14 days., Disp: 14 tablet, Rfl: 0    citalopram (CeleXA) 20 mg tablet, Take 1 tablet (20 mg) by mouth once daily., Disp: 90 tablet, Rfl: 3    hydroCHLOROthiazide (HYDRODiuril) 25 mg tablet, Take 1 tablet (25 mg) by mouth once daily., Disp: 14 tablet, Rfl: 0    hydroCHLOROthiazide (HYDRODiuril) 25 mg tablet, Take 1 tablet (25 mg) by mouth once daily., Disp: 90 tablet, Rfl: 0    latanoprost (Xalatan) 0.005 % ophthalmic solution, Latanoprost 0.005 % Ophthalmic Solution  Quantity: 8  Refills: 0      Start : 24-May-2021  Active, Disp: , Rfl:     levothyroxine (Synthroid) 50 mcg tablet, Take 1 tablet (50 mcg) by mouth once daily in the morning. Take before meals., Disp: 90 tablet, Rfl: 3    meclizine (Antivert) 25 mg tablet, Take 1 tablet (25 mg) by mouth 2 times a day as needed for dizziness for up to 90 doses., Disp: 90 tablet, Rfl: 0    meloxicam (Mobic) 7.5 mg tablet, Take 1 tablet (7.5 mg) by mouth once daily., Disp: 90 tablet, Rfl: 3    metFORMIN (Glucophage) 500 mg tablet, TAKE 1 TABLET BY MOUTH TWICE  DAILY, Disp: 180 tablet, Rfl: 3    metoprolol succinate XL (Toprol-XL) 50 mg 24 hr tablet, Take 1 tablet (50 mg) by mouth once daily. Do not crush or chew., Disp: 30 tablet, Rfl: 0    omeprazole (PriLOSEC) 40 mg DR capsule, TAKE 1 CAPSULE BY MOUTH ONCE  DAILY IN THE MORNING. TAKE  BEFORE MEALS, Disp: 90 capsule, Rfl: 3    oxygen (O2) therapy, Inhale 3 L/min at 180,000 mL/hr once daily at bedtime. via nasal canula, Disp: , Rfl:     peg 400-propylene glycol (SYSTANE) 0.4-0.3 % drops ophthalmic drops, ophtho, Disp: , Rfl:     simvastatin (Zocor) 20 mg tablet, TAKE 1 TABLET BY MOUTH ONCE  DAILY, Disp: 90 tablet, Rfl: 3    tiZANidine (Zanaflex) 2 mg tablet, Take 1 tablet (2 mg) by mouth every 8 hours if needed for muscle spasms., Disp: 90 tablet, Rfl: 0     VITALS  Pt does not have vitals available at time of visit.    Physical Exam     Limited physical  exam in virtual platform   No conversational dyspnea      .Assessment/Plan   Problem List Items Addressed This Visit             ICD-10-CM    HTN (hypertension) - Primary I10    Relevant Medications    amLODIPine (Norvasc) 5 mg tablet    amLODIPine (Norvasc) 5 mg tablet    metoprolol succinate XL (Toprol-XL) 50 mg 24 hr tablet     Other Visit Diagnoses         Codes    Pedal edema     R60.0    Relevant Medications    hydroCHLOROthiazide (HYDRODiuril) 25 mg tablet    hydroCHLOROthiazide (HYDRODiuril) 25 mg tablet    Anxiety     F41.9    Relevant Medications    ALPRAZolam (Xanax) 0.25 mg tablet              Stop labetalol entirely. Start Toprol XL  Increase hydrochlorothiazide from 12.5 to 25 daily  Continue amlodipine 5mg

## 2024-12-19 ENCOUNTER — TELEPHONE (OUTPATIENT)
Dept: PRIMARY CARE | Facility: CLINIC | Age: 84
End: 2024-12-19
Payer: MEDICARE

## 2024-12-19 VITALS — SYSTOLIC BLOOD PRESSURE: 145 MMHG | DIASTOLIC BLOOD PRESSURE: 76 MMHG | HEART RATE: 54 BPM

## 2024-12-20 DIAGNOSIS — I10 HYPERTENSION, UNSPECIFIED TYPE: Primary | ICD-10-CM

## 2024-12-20 RX ORDER — AMLODIPINE BESYLATE 10 MG/1
10 TABLET ORAL DAILY
Qty: 90 TABLET | Refills: 3 | Status: SHIPPED | OUTPATIENT
Start: 2024-12-20

## 2024-12-20 NOTE — PROGRESS NOTES
Increase amlodipine from 5mg every day to 10mg every day. Continue other antihypertensive meds. Needs better control.

## 2024-12-26 ENCOUNTER — APPOINTMENT (OUTPATIENT)
Dept: PRIMARY CARE | Facility: CLINIC | Age: 84
End: 2024-12-26
Payer: MEDICARE

## 2024-12-26 VITALS — HEART RATE: 92 BPM | DIASTOLIC BLOOD PRESSURE: 76 MMHG | OXYGEN SATURATION: 98 % | SYSTOLIC BLOOD PRESSURE: 142 MMHG

## 2024-12-26 DIAGNOSIS — I10 HYPERTENSION, UNSPECIFIED TYPE: ICD-10-CM

## 2024-12-26 DIAGNOSIS — I10 PRIMARY HYPERTENSION: Primary | ICD-10-CM

## 2024-12-26 DIAGNOSIS — I10 PRIMARY HYPERTENSION: ICD-10-CM

## 2024-12-26 RX ORDER — METOPROLOL SUCCINATE 50 MG/1
50 TABLET, EXTENDED RELEASE ORAL DAILY
Qty: 90 TABLET | Refills: 3 | Status: SHIPPED | OUTPATIENT
Start: 2024-12-26

## 2024-12-26 RX ORDER — CLONIDINE HYDROCHLORIDE 0.1 MG/1
0.1 TABLET ORAL 2 TIMES DAILY
Qty: 60 TABLET | Refills: 0 | Status: SHIPPED | OUTPATIENT
Start: 2024-12-26 | End: 2025-01-25

## 2024-12-26 RX ORDER — AMLODIPINE BESYLATE 10 MG/1
10 TABLET ORAL DAILY
Qty: 90 TABLET | Refills: 3 | Status: SHIPPED | OUTPATIENT
Start: 2024-12-26

## 2024-12-27 RX ORDER — CLONIDINE HYDROCHLORIDE 0.1 MG/1
TABLET ORAL
Qty: 180 TABLET | OUTPATIENT
Start: 2024-12-27

## 2025-01-09 ENCOUNTER — TELEPHONE (OUTPATIENT)
Dept: PRIMARY CARE | Facility: CLINIC | Age: 85
End: 2025-01-09
Payer: MEDICARE

## 2025-01-09 NOTE — TELEPHONE ENCOUNTER
Patient only has 5 metoprolol left, thought the refill was supposed to come from [facility], has not received, please call

## 2025-01-29 DIAGNOSIS — M51.379 DEGENERATION OF INTERVERTEBRAL DISC OF LUMBOSACRAL REGION, UNSPECIFIED WHETHER PAIN PRESENT: ICD-10-CM

## 2025-01-29 DIAGNOSIS — M25.561 PAIN IN BOTH KNEES, UNSPECIFIED CHRONICITY: ICD-10-CM

## 2025-01-29 DIAGNOSIS — M25.562 PAIN IN BOTH KNEES, UNSPECIFIED CHRONICITY: ICD-10-CM

## 2025-01-29 DIAGNOSIS — I10 PRIMARY HYPERTENSION: ICD-10-CM

## 2025-01-29 DIAGNOSIS — M54.12 CERVICAL RADICULOPATHY: ICD-10-CM

## 2025-01-29 RX ORDER — CLONIDINE HYDROCHLORIDE 0.1 MG/1
0.1 TABLET ORAL 2 TIMES DAILY
Qty: 180 TABLET | Refills: 3 | Status: SHIPPED | OUTPATIENT
Start: 2025-01-29

## 2025-02-05 DIAGNOSIS — M96.1 POSTLAMINECTOMY SYNDROME, LUMBAR REGION: Primary | ICD-10-CM

## 2025-03-02 NOTE — PROGRESS NOTES
Subjective     HPI   Linsey Donis is a 84 y.o. year old female patient with presenting to clinic with concern for   Chief Complaint   Patient presents with    Follow-up     3 mth       Next visit is [facility]    xanax qty 90 x 30 days     HTN  Dr Guy  -amlodipine 10- having pedal edema- reduce back to 5mg  -Add lisinopril 10  -hydrochlorothiazide 25  -metoprolol XL 50  -clonidine 0.1  BP Readings from Last 5 Encounters:   03/03/25 124/66   12/26/24 142/76   12/19/24 145/76   12/12/24 170/73   11/20/24 (!) 185/102     HLD  -simvastatin    CVA 1980? [facility]?  Hx Rheumatic fever  Echo 6/2020 [facility] moderate aortic valve stenosis.   -ASA     Hypothyroidism  -levothyroxine 50     DM2  -metformin bid  Lab Results   Component Value Date    HGBA1C 7.0 (A) 09/12/2024   ASA- 81 since 1980  Statin-simvastatin  ARB/ACEI, start lisinopril today  Optho- annually  Regular foot checks     GERD  -omeprazole 40     Chronic pain  -mobic 7.5 every day NOT TAKING IBUPROFEN OR ALEVE  -tizanidine  Pain management injections haven't helped. Won't go back to pain mgt, didn't help in the past.  She not a surgical candidate for neck, knees or back.      Anxiety  -celexa 20   -xanax tid         OARRS:  Yessenia Stephen PA-C on 3/2/2025 11:08 AM  I have personally reviewed the OARRS report for Linsey Donis. I have considered the risks of abuse, dependence, addiction and diversion    Is the patient prescribed a combination of a benzodiazepine and opioid?  No    Last Urine Drug Screen / ordered today: No  Recent Results (from the past 8760 hours)   Confirmation Opiate/Opioid/Benzo Prescription Compliance    Collection Time: 06/12/24  8:48 AM   Result Value Ref Range    Clonazepam <25 <25 ng/mL    7-Aminoclonazepam <25 <25 ng/mL    Alprazolam 135 (H) <25 ng/mL    Alpha-Hydroxyalprazolam 225 (H) <25 ng/mL    Midazolam <25 <25 ng/mL    Alpha-Hydroxymidazolam <25 <25 ng/mL    Chlordiazepoxide <25 <25 ng/mL    Diazepam <25 <25 ng/mL     Nordiazepam <25 <25 ng/mL    Temazepam <25 <25 ng/mL    Oxazepam <25 <25 ng/mL    Lorazepam <25 <25 ng/mL    Methadone <25 <25 ng/mL    EDDP <25 <25 ng/mL    6-Acetylmorphine <25 <25 ng/mL    Codeine <50 <50 ng/mL    Hydrocodone <25 <25 ng/mL    Hydromorphone <25 <25 ng/mL    Morphine  <50 <50 ng/mL    Norhydrocodone <25 <25 ng/mL    Noroxycodone <25 <25 ng/mL    Oxycodone <25 <25 ng/mL    Oxymorphone <25 <25 ng/mL    Fentanyl <2.5 <2.5 ng/mL    Norfentanyl <2.5 <2.5 ng/mL    Tramadol <50 <50 ng/mL    O-Desmethyltramadol <50 <50 ng/mL    Zolpidem <25 <25 ng/mL    Zolpidem Metabolite (ZCA) <25 <25 ng/mL   Screen Opiate/Opioid/Benzo Prescription Compliance    Collection Time: 06/12/24  8:48 AM   Result Value Ref Range    Creatinine, Urine Random 188.6 20.0 - 320.0 mg/dL    Amphetamine Screen, Urine Presumptive Negative Presumptive Negative    Barbiturate Screen, Urine Presumptive Negative Presumptive Negative    Cannabinoid Screen, Urine Presumptive Negative Presumptive Negative    Cocaine Metabolite Screen, Urine Presumptive Negative Presumptive Negative    PCP Screen, Urine Presumptive Negative Presumptive Negative     Results are as expected.         Controlled Substance Agreement:  Date of the Last Agreement:  9/12/24  Reviewed Controlled Substance Agreement including but not limited to the benefits, risks, and alternatives to treatment with a Controlled Substance medication(s).    Benzodiazepines:  What is the patient's goal of therapy? Reduction of anxiety, control of anxiety attacks  Is this being achieved with current treatment? yes    AVA-7:14  No data recorded    Activities of Daily Living:   Is your overall impression that this patient is benefiting (symptom reduction outweighs side effects) from benzodiazepine therapy? Yes     1. Physical Functioning: Same  2. Family Relationship: Better  3. Social Relationship: Better  4. Mood: Better  5. Sleep Patterns: Same  6. Overall Function: Better    Patient  Active Problem List   Diagnosis    Allergic rhinitis    Anxiety disorder    Chronic fatigue    Chronic neck pain    Disorder of jaw    Dizziness    DM2 (diabetes mellitus, type 2) (Multi)    Dyslipidemia    Adult hypothyroidism    Enlarged thyroid    GERD (gastroesophageal reflux disease)    Gout    Hearing loss    HTN (hypertension)    Insomnia    Polyarthralgia    Vitamin D deficiency    Sjogren's syndrome with keratoconjunctivitis sicca (Multi)    Cervical radiculopathy    DDD (degenerative disc disease), lumbosacral    Edema of both legs    History of stroke    Knee pain, bilateral    Obstructive sleep apnea syndrome    Postlaminectomy syndrome, lumbar region    Temporomandibular joint pain dysfunction syndrome    Stroke (Multi)    Urine test positive for microalbuminuria    Chronic pain syndrome    Sjogren's syndrome    Mixed simple and mucopurulent chronic bronchitis (Multi)    Controlled substance agreement signed    Constipation    Hypertensive heart disease with heart failure       Past Medical History:   Diagnosis Date    Acute bilateral low back pain with right-sided sciatica 06/15/2018    COPD with exacerbation (Multi) 02/15/2018    Deep vein thrombosis (DVT) of other vein of lower extremity, unspecified chronicity, unspecified laterality (Multi) 04/27/2023    Esophagitis 02/12/2014    History of cervical spinal surgery 01/22/2018    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    History of lumbar surgery 01/22/2018    Left sided sciatica 12/02/2014    Osteomyelitis 10/18/2013    Other conditions influencing health status     Stroke syndrome    Otitis media, unspecified, bilateral 04/12/2022    Acute bilateral otitis media    Pain in right knee 11/10/2022    Acute pain of right knee    Personal history of other diseases of the digestive system     History of diverticulitis of colon    Personal history of other diseases of the digestive system     History of glossitis    Personal history of other  "diseases of the musculoskeletal system and connective tissue     History of fibromyositis    Personal history of other diseases of the musculoskeletal system and connective tissue     History of fibromyositis    Personal history of other diseases of the nervous system and sense organs 04/15/2022    History of otitis media    Simple chronic bronchitis (Multi) 02/15/2018    Sjogren syndrome, unspecified (Multi)     Sjogrens syndrome    Unspecified otitis externa, unspecified ear 04/15/2022    Otitis externa      Past Surgical History:   Procedure Laterality Date    CATARACT EXTRACTION  11/15/2012    Cataract Surgery    CATARACT EXTRACTION  2013    Cataract Surgery    COLECTOMY      8\" colon removed    HYSTERECTOMY  11/15/2012    Hysterectomy    HYSTERECTOMY  2013    Hysterectomy    MR HEAD ANGIO WO IV CONTRAST  2023    MR HEAD ANGIO WO IV CONTRAST 2023 GEN MRI    MR NECK ANGIO WO IV CONTRAST  2023    MR NECK ANGIO WO IV CONTRAST 2023 GEN MRI    OTHER SURGICAL HISTORY  2013    Fusion / Refusion Of Vertebrae      Family History   Problem Relation Name Age of Onset    No Known Problems Mother      No Known Problems Father        Social History     Tobacco Use    Smoking status: Former     Current packs/day: 0.00     Average packs/day: 1 pack/day for 20.0 years (20.0 ttl pk-yrs)     Types: Cigarettes     Start date:      Quit date:      Years since quittin.1    Smokeless tobacco: Never   Substance Use Topics    Alcohol use: Never        Current Outpatient Medications:     albuterol 2.5 mg /3 mL (0.083 %) nebulizer solution, Take 3 mL (2.5 mg) by nebulization every 8 hours if needed for wheezing., Disp: 270 mL, Rfl: 2    citalopram (CeleXA) 20 mg tablet, Take 1 tablet (20 mg) by mouth once daily., Disp: 90 tablet, Rfl: 3    cloNIDine (Catapres) 0.1 mg tablet, Take 1 tablet (0.1 mg) by mouth 2 times a day., Disp: 180 tablet, Rfl: 3    latanoprost (Xalatan) 0.005 % " ophthalmic solution, Latanoprost 0.005 % Ophthalmic Solution  Quantity: 8  Refills: 0      Start : 24-May-2021  Active, Disp: , Rfl:     meclizine (Antivert) 25 mg tablet, Take 1 tablet (25 mg) by mouth 2 times a day as needed for dizziness for up to 90 doses., Disp: 90 tablet, Rfl: 0    meloxicam (Mobic) 7.5 mg tablet, Take 1 tablet (7.5 mg) by mouth once daily., Disp: 90 tablet, Rfl: 3    metFORMIN (Glucophage) 500 mg tablet, TAKE 1 TABLET BY MOUTH TWICE  DAILY, Disp: 180 tablet, Rfl: 3    metoprolol succinate XL (Toprol-XL) 50 mg 24 hr tablet, Take 1 tablet (50 mg) by mouth once daily. Do not crush or chew., Disp: 90 tablet, Rfl: 3    omeprazole (PriLOSEC) 40 mg DR capsule, TAKE 1 CAPSULE BY MOUTH ONCE  DAILY IN THE MORNING. TAKE  BEFORE MEALS, Disp: 90 capsule, Rfl: 3    oxygen (O2) therapy, Inhale 3 L/min at 180,000 mL/hr once daily at bedtime. via nasal canula, Disp: , Rfl:     peg 400-propylene glycol (SYSTANE) 0.4-0.3 % drops ophthalmic drops, ophtho, Disp: , Rfl:     simvastatin (Zocor) 20 mg tablet, TAKE 1 TABLET BY MOUTH ONCE  DAILY, Disp: 90 tablet, Rfl: 3    tiZANidine (Zanaflex) 2 mg tablet, Take 1 tablet (2 mg) by mouth every 8 hours if needed for muscle spasms., Disp: 90 tablet, Rfl: 0    ALPRAZolam (Xanax) 0.25 mg tablet, Take 1 tablet (0.25 mg) by mouth 3 times a day as needed for anxiety., Disp: 90 tablet, Rfl: 2    amLODIPine (Norvasc) 5 mg tablet, Take 1 tablet (5 mg) by mouth once daily., Disp: 90 tablet, Rfl: 3    hydroCHLOROthiazide (HYDRODiuril) 25 mg tablet, Take 1 tablet (25 mg) by mouth once daily., Disp: 90 tablet, Rfl: 3    levothyroxine (Synthroid) 50 mcg tablet, Take 1 tablet (50 mcg) by mouth once daily in the morning. Take before meals., Disp: 90 tablet, Rfl: 3    lisinopril 10 mg tablet, Take 1 tablet (10 mg) by mouth once daily., Disp: 90 tablet, Rfl: 3     Review of Systems  Constitutional: Denies fever  HEENT: Denies ST, earache  CVS: Denies Chest pain  Pulmonary: Denies  "wheezing, SOB  GI: Denies N/V  : Denies dysuria  Musculoskeletal:  Denies myalgia  Neuro: Denies focal weakness or numbness.  Skin: Denies Rashes.  *Review of Systems is negative unless otherwise mentioned in HPI or ROS above.    Objective   /66   Pulse 67   Temp 36.2 °C (97.1 °F)   Ht 1.575 m (5' 2\")   Wt 73.9 kg (163 lb)   SpO2 96%   BMI 29.81 kg/m²  reviewed Body mass index is 29.81 kg/m².     Physical Exam  Constitutional: NAD.  Resting comfortably.  Head: Atraumatic, normocephalic.  ENT: Moist oral mucosa. Nasal mucosa wnl.   Cardiac: Regular rate & rhythm.   Pulmonary: Lungs clear bilat  GI: Soft, Nontender, nondistended.   Musculoskeletal: No peripheral edema.   Skin: No evidence of trauma. No rashes  Psych: Intact judgement and insight.    .Assessment/Plan   Problem List Items Addressed This Visit             ICD-10-CM    Adult hypothyroidism E03.9    Relevant Medications    levothyroxine (Synthroid) 50 mcg tablet    HTN (hypertension) I10    Relevant Medications    amLODIPine (Norvasc) 5 mg tablet    lisinopril 10 mg tablet     Other Visit Diagnoses         Codes    Anxiety     F41.9    Relevant Medications    ALPRAZolam (Xanax) 0.25 mg tablet    Pedal edema     R60.0    Relevant Medications    hydroCHLOROthiazide (HYDRODiuril) 25 mg tablet            "

## 2025-03-03 ENCOUNTER — APPOINTMENT (OUTPATIENT)
Dept: PRIMARY CARE | Facility: CLINIC | Age: 85
End: 2025-03-03
Payer: MEDICARE

## 2025-03-03 VITALS
BODY MASS INDEX: 30 KG/M2 | SYSTOLIC BLOOD PRESSURE: 124 MMHG | DIASTOLIC BLOOD PRESSURE: 66 MMHG | HEART RATE: 67 BPM | HEIGHT: 62 IN | TEMPERATURE: 97.1 F | WEIGHT: 163 LBS | OXYGEN SATURATION: 96 %

## 2025-03-03 DIAGNOSIS — I11.0 HYPERTENSIVE HEART DISEASE WITH HEART FAILURE: ICD-10-CM

## 2025-03-03 DIAGNOSIS — E11.9 TYPE 2 DIABETES MELLITUS WITHOUT COMPLICATION, WITHOUT LONG-TERM CURRENT USE OF INSULIN (MULTI): ICD-10-CM

## 2025-03-03 DIAGNOSIS — E03.9 ADULT HYPOTHYROIDISM: ICD-10-CM

## 2025-03-03 DIAGNOSIS — I10 HYPERTENSION, UNSPECIFIED TYPE: ICD-10-CM

## 2025-03-03 DIAGNOSIS — R60.0 PEDAL EDEMA: ICD-10-CM

## 2025-03-03 DIAGNOSIS — F41.9 ANXIETY: ICD-10-CM

## 2025-03-03 PROBLEM — J41.8 MIXED SIMPLE AND MUCOPURULENT CHRONIC BRONCHITIS (MULTI): Status: RESOLVED | Noted: 2023-06-28 | Resolved: 2025-03-03

## 2025-03-03 PROCEDURE — 3078F DIAST BP <80 MM HG: CPT | Performed by: PHYSICIAN ASSISTANT

## 2025-03-03 PROCEDURE — 99214 OFFICE O/P EST MOD 30 MIN: CPT | Performed by: PHYSICIAN ASSISTANT

## 2025-03-03 PROCEDURE — 3074F SYST BP LT 130 MM HG: CPT | Performed by: PHYSICIAN ASSISTANT

## 2025-03-03 PROCEDURE — 1160F RVW MEDS BY RX/DR IN RCRD: CPT | Performed by: PHYSICIAN ASSISTANT

## 2025-03-03 PROCEDURE — G2211 COMPLEX E/M VISIT ADD ON: HCPCS | Performed by: PHYSICIAN ASSISTANT

## 2025-03-03 PROCEDURE — 1159F MED LIST DOCD IN RCRD: CPT | Performed by: PHYSICIAN ASSISTANT

## 2025-03-03 PROCEDURE — 1036F TOBACCO NON-USER: CPT | Performed by: PHYSICIAN ASSISTANT

## 2025-03-03 RX ORDER — HYDROCHLOROTHIAZIDE 25 MG/1
25 TABLET ORAL DAILY
Qty: 90 TABLET | Refills: 3 | Status: SHIPPED | OUTPATIENT
Start: 2025-03-03 | End: 2025-03-03

## 2025-03-03 RX ORDER — LEVOTHYROXINE SODIUM 50 UG/1
50 TABLET ORAL
Qty: 90 TABLET | Refills: 3 | Status: SHIPPED | OUTPATIENT
Start: 2025-03-03 | End: 2026-03-03

## 2025-03-03 RX ORDER — AMLODIPINE BESYLATE 5 MG/1
5 TABLET ORAL DAILY
Qty: 90 TABLET | Refills: 3 | Status: SHIPPED | OUTPATIENT
Start: 2025-03-03 | End: 2025-03-03

## 2025-03-03 RX ORDER — AMLODIPINE BESYLATE 5 MG/1
5 TABLET ORAL DAILY
Qty: 90 TABLET | Refills: 3 | Status: SHIPPED | OUTPATIENT
Start: 2025-03-03

## 2025-03-03 RX ORDER — ALPRAZOLAM 0.25 MG/1
0.25 TABLET ORAL 3 TIMES DAILY PRN
Qty: 90 TABLET | Refills: 2 | Status: SHIPPED | OUTPATIENT
Start: 2025-03-03

## 2025-03-03 RX ORDER — LISINOPRIL 10 MG/1
10 TABLET ORAL DAILY
Qty: 90 TABLET | Refills: 3 | Status: SHIPPED | OUTPATIENT
Start: 2025-03-03

## 2025-03-03 RX ORDER — LISINOPRIL 10 MG/1
10 TABLET ORAL DAILY
Qty: 90 TABLET | Refills: 3 | Status: SHIPPED | OUTPATIENT
Start: 2025-03-03 | End: 2025-03-03

## 2025-03-03 RX ORDER — HYDROCHLOROTHIAZIDE 25 MG/1
25 TABLET ORAL DAILY
Qty: 90 TABLET | Refills: 3 | Status: SHIPPED | OUTPATIENT
Start: 2025-03-03

## 2025-03-03 RX ORDER — LEVOTHYROXINE SODIUM 50 UG/1
50 TABLET ORAL
Qty: 90 TABLET | Refills: 3 | Status: SHIPPED | OUTPATIENT
Start: 2025-03-03 | End: 2025-03-03

## 2025-03-03 NOTE — PATIENT INSTRUCTIONS
Reducing dose of amlodipine from 10mg to 5 mg to help with ankle swelling    Adding lisinopril 10mg to your BP meds.    Ok to take mobic with meclizine when you need it

## 2025-05-19 ENCOUNTER — TELEPHONE (OUTPATIENT)
Dept: PRIMARY CARE | Facility: CLINIC | Age: 85
End: 2025-05-19
Payer: MEDICARE

## 2025-05-19 DIAGNOSIS — J40 BRONCHITIS: Primary | ICD-10-CM

## 2025-05-19 RX ORDER — DOXYCYCLINE 100 MG/1
100 CAPSULE ORAL 2 TIMES DAILY
Qty: 20 CAPSULE | Refills: 0 | Status: SHIPPED | OUTPATIENT
Start: 2025-05-19 | End: 2025-05-29

## 2025-05-19 NOTE — TELEPHONE ENCOUNTER
Has a deep cough, using nebulizer and coriciden, productive cough, nasal drainage.  Afraid it will turn to bronchitis.  Would like abx called in.

## 2025-06-10 ENCOUNTER — TELEPHONE (OUTPATIENT)
Dept: PRIMARY CARE | Facility: CLINIC | Age: 85
End: 2025-06-10
Payer: MEDICARE

## 2025-06-10 DIAGNOSIS — R39.9 URINARY SYMPTOM OR SIGN: Primary | ICD-10-CM

## 2025-06-19 ENCOUNTER — APPOINTMENT (OUTPATIENT)
Dept: PRIMARY CARE | Facility: CLINIC | Age: 85
End: 2025-06-19
Payer: MEDICARE

## 2025-06-19 VITALS
HEART RATE: 54 BPM | TEMPERATURE: 96.9 F | BODY MASS INDEX: 29 KG/M2 | DIASTOLIC BLOOD PRESSURE: 68 MMHG | WEIGHT: 157.6 LBS | SYSTOLIC BLOOD PRESSURE: 126 MMHG | OXYGEN SATURATION: 96 % | HEIGHT: 62 IN

## 2025-06-19 DIAGNOSIS — Z78.0 ASYMPTOMATIC POSTMENOPAUSAL STATE: ICD-10-CM

## 2025-06-19 DIAGNOSIS — J43.2 CENTRILOBULAR EMPHYSEMA (MULTI): ICD-10-CM

## 2025-06-19 DIAGNOSIS — Z79.899 CONTROLLED SUBSTANCE AGREEMENT SIGNED: ICD-10-CM

## 2025-06-19 DIAGNOSIS — E11.9 TYPE 2 DIABETES MELLITUS WITHOUT COMPLICATION, WITHOUT LONG-TERM CURRENT USE OF INSULIN: ICD-10-CM

## 2025-06-19 DIAGNOSIS — F41.9 ANXIETY: ICD-10-CM

## 2025-06-19 DIAGNOSIS — J42 CHRONIC BRONCHITIS, UNSPECIFIED CHRONIC BRONCHITIS TYPE (MULTI): ICD-10-CM

## 2025-06-19 DIAGNOSIS — R35.0 URINARY FREQUENCY: ICD-10-CM

## 2025-06-19 DIAGNOSIS — Z00.00 MEDICARE ANNUAL WELLNESS VISIT, SUBSEQUENT: Primary | ICD-10-CM

## 2025-06-19 LAB — POC HEMOGLOBIN A1C: 7.1 % (ref 4.2–6.5)

## 2025-06-19 PROCEDURE — 83036 HEMOGLOBIN GLYCOSYLATED A1C: CPT | Performed by: PHYSICIAN ASSISTANT

## 2025-06-19 PROCEDURE — 1159F MED LIST DOCD IN RCRD: CPT | Performed by: PHYSICIAN ASSISTANT

## 2025-06-19 PROCEDURE — 3078F DIAST BP <80 MM HG: CPT | Performed by: PHYSICIAN ASSISTANT

## 2025-06-19 PROCEDURE — 1160F RVW MEDS BY RX/DR IN RCRD: CPT | Performed by: PHYSICIAN ASSISTANT

## 2025-06-19 PROCEDURE — G0439 PPPS, SUBSEQ VISIT: HCPCS | Performed by: PHYSICIAN ASSISTANT

## 2025-06-19 PROCEDURE — 3074F SYST BP LT 130 MM HG: CPT | Performed by: PHYSICIAN ASSISTANT

## 2025-06-19 RX ORDER — ALPRAZOLAM 0.25 MG/1
0.25 TABLET ORAL 3 TIMES DAILY PRN
Qty: 90 TABLET | Refills: 2 | Status: SHIPPED | OUTPATIENT
Start: 2025-06-19

## 2025-06-19 ASSESSMENT — ENCOUNTER SYMPTOMS: LOSS OF SENSATION IN FEET: 0

## 2025-06-19 NOTE — PROGRESS NOTES
Subjective   HPI   Linsey Donis is a 84 y.o. year old female patient with presenting to clinic with concern for Medicare Visit     Chief Complaint   Patient presents with   • Medicare Annual Wellness Visit Subsequent       xanax qty 90 x 30 days     Labs due next visit     Recent UTI, was seen at [facility]. Cipro and fluconazole. Possible bladder prolapse issues with skin irritation at labia anteriorly    Increased stress with  Ty's dementia. Trying to get help by [facility]    HTN  Dr Guy  -amlodipine 5mg  -lisinopril 10  -hydrochlorothiazide 25  -metoprolol XL 50  -clonidine 0.1  BP Readings from Last 5 Encounters:   06/19/25 126/68   03/03/25 124/66   12/26/24 142/76   12/19/24 145/76   12/12/24 170/73     HLD  -simvastatin  Lab Results   Component Value Date    LDLCALC 74 08/22/2024      CVA 1980? [facility]?  Hx Rheumatic fever  Echo 6/2020 [facility] moderate aortic valve stenosis.   -ASA     Hypothyroidism  -levothyroxine 50  Lab Results   Component Value Date    TSH 2.10 08/22/2024      DM2  -metformin bid  Lab Results   Component Value Date    HGBA1C 7.1 (A) 06/19/2025    HGBA1C 7.0 (A) 09/12/2024    HGBA1C 6.8 (A) 03/05/2024     Lab Results   Component Value Date    LDLCALC 74 08/22/2024    CREATININE 0.61 11/18/2024   Diabetes Composite Score: 4   Values used to calculate this score:    Points  Metrics       1        Blood Pressure: 124/66       1        Prescribed Statins: N/A - patient does not meet statin therapy criteria       1        Hemoglobin A1c: 7%       1        Smokes Tobacco: No       0        Prescribed Aspirin: No  ASA- 81 since 1980  Statin-simvastatin  ARB/ACEI, start lisinopril today  Optho- annually  Regular foot checks     GERD  -omeprazole 40     Chronic pain  -mobic 7.5 every day NOT TAKING IBUPROFEN OR ALEVE. Tylenol is ok Can take 1-2 tablets, up to every 6 hours.   -tizanidine  Pain management injections haven't helped. Won't go back to pain mgt, didn't  help in the past.  She not a surgical candidate for neck, knees or back.       Anxiety  -celexa 20   -xanax tid    OARRS:  Yessenia Stephen PA-C on 6/19/2025  1:02 PM  I have personally reviewed the OARRS report for Linsey Donis. I have considered the risks of abuse, dependence, addiction and diversion    Is the patient prescribed a combination of a benzodiazepine and opioid?  No    Last Urine Drug Screen / ordered today: No  No results found for this or any previous visit (from the past 8760 hours).  N/A    Clinical rationale for not completing a Urine Drug Screen: Patient suffering from anuria or incontinent      Controlled Substance Agreement:  Date of the Last Agreement: 9/12/24  Reviewed Controlled Substance Agreement including but not limited to the benefits, risks, and alternatives to treatment with a Controlled Substance medication(s).    Benzodiazepines:  What is the patient's goal of therapy? Reduction of anxiety, control of anxiety attacks  Is this being achieved with current treatment? yes    AVA-7:  No data recorded    Activities of Daily Living:   Is your overall impression that this patient is benefiting (symptom reduction outweighs side effects) from benzodiazepine therapy? Yes     1. Physical Functioning: Better  2. Family Relationship: Better  3. Social Relationship: Better  4. Mood: Better  5. Sleep Patterns: Same  6. Overall Function: Better    Patient Care Team:  Yessenia Stephen PA-C as PCP - General (Family Medicine)  Yessenia Stephen PA-C as PCP - [facility] Attributed Provider     Specialists    Past Medical, Surgical, and Family History reviewed and updated in chart.    Reviewed all medications by prescribing practitioner or clinical pharmacist (such as prescriptions, OTCs, herbal therapies and supplements) and documented in the medical record.     Preventative Health   Health Maintenance Due   Topic Date Due   • Diabetic Eye Exam  01/09/2021        There are no preventive care reminders  "to display for this patient.     Immunizations Reviewed  Immunization History   Administered Date(s) Administered   • COVID-19, mRNA, LNP-S, PF, 30 mcg/0.3 mL dose 10/23/2021   • Influenza, Seasonal, Quadrivalent, Adjuvanted 2022   • Pfizer COVID-19 vaccine, 12 years and older, (30mcg/0.3mL) (Comirnaty) 2024   • Pfizer COVID-19 vaccine, bivalent, age 12 years and older (30 mcg/0.3 mL) 2022   • Pfizer Gray Cap SARS-CoV-2 04/10/2022   • Pfizer Purple Cap SARS-CoV-2 2021   • Pneumococcal conjugate vaccine, 13-valent (PREVNAR 13) 10/08/2015        Problem List Reviewed  Problem List[1]    Medical History[2]   Surgical History[3]   Family History[4]   Social History     Tobacco Use   • Smoking status: Former     Current packs/day: 0.00     Average packs/day: 1 pack/day for 20.0 years (20.0 ttl pk-yrs)     Types: Cigarettes     Start date:      Quit date:      Years since quittin.4   • Smokeless tobacco: Never   Substance Use Topics   • Alcohol use: Never        Medications Reviewed  Medications Ordered Prior to Encounter[5]     Review of Systems  Constitutional: Denies fever  HEENT: Denies ST, earache  CVS: Denies Chest pain  Pulmonary: Denies wheezing, SOB  GI: Denies N/V  : Denies dysuria  Musculoskeletal:  Denies myalgia  Neuro: Denies focal weakness or numbness.  Skin: Denies Rashes.  *Review of Systems is negative unless otherwise mentioned in HPI or ROS above.      @OBJECTIVEBEGIN  /68   Pulse 54   Temp 36.1 °C (96.9 °F)   Ht 1.575 m (5' 2\")   Wt 71.5 kg (157 lb 9.6 oz)   SpO2 96%   BMI 28.83 kg/m²  reviewed Body mass index is 28.83 kg/m².     Physical Exam  Constitutional: NAD. Afebrile. Resting comfortably.  ENT: Nasal mucosa and oropharynx: moist oral mucosa. Posterior oropharynx nonerythematous. No posterior pharyngeal streaking.  Eyes: PERRLA. EOM intact. No vertical or circular nystagmus.  Lymph: No anterior cervical chain or submandibular lymphadenopathy. No " sentinel lymph nodes.  Cardiac: Regular rate & rhythm. No murmur, gallops, or rubs.  Pulmonary: Lungs clear to auscultation bilaterally with good aeration. No wheezes, rhonchi, or rales. Normal WOB.  GI: Soft, Nontender, nondistended. No guarding. Normal BS x4.  : No suprapubic tenderness. No CVA tenderness to percussion.   Musculoskeletal: No peripheral edema.   Skin: No evidence of trauma. No rashes  Neuro: No focal neuro deficits. Normal gait without assistive devices.  Psych: Intact judgement and insight.    MDM        .Assessment/Plan   Problem List Items Addressed This Visit           ICD-10-CM    DM2 (diabetes mellitus, type 2) (Multi) E11.9    Relevant Orders    POCT glycosylated hemoglobin (Hb A1C) manually resulted (Completed)    Chronic bronchitis, unspecified chronic bronchitis type (Multi) J42    Controlled substance agreement signed Z79.899    Relevant Orders    Opiate/Opioid/Benzo Prescription Compliance    Centrilobular emphysema (Multi) J43.2     Other Visit Diagnoses         Codes      Medicare annual wellness visit, subsequent    -  Primary Z00.00      Asymptomatic postmenopausal state     Z78.0    Relevant Orders    XR DEXA bone density      Urinary frequency     R35.0    Relevant Orders    Referral to Urology      Anxiety     F41.9    Relevant Medications    ALPRAZolam (Xanax) 0.25 mg tablet                     [1]  Patient Active Problem List  Diagnosis   • Allergic rhinitis   • Anxiety disorder   • Chronic fatigue   • Chronic neck pain   • Disorder of jaw   • Dizziness   • DM2 (diabetes mellitus, type 2) (Multi)   • Dyslipidemia   • Adult hypothyroidism   • Enlarged thyroid   • GERD (gastroesophageal reflux disease)   • Gout   • Hearing loss   • HTN (hypertension)   • Insomnia   • Polyarthralgia   • Vitamin D deficiency   • Sjogren's syndrome with keratoconjunctivitis sicca   • Cervical radiculopathy   • DDD (degenerative disc disease), lumbosacral   • Edema of both legs   • History of  stroke   • Knee pain, bilateral   • Obstructive sleep apnea syndrome   • Postlaminectomy syndrome, lumbar region   • Temporomandibular joint pain dysfunction syndrome   • Stroke (Multi)   • Urine test positive for microalbuminuria   • Chronic pain syndrome   • Chronic bronchitis, unspecified chronic bronchitis type (Multi)   • Sjogren's syndrome   • Controlled substance agreement signed   • Constipation   • Hypertensive heart disease with heart failure   • Centrilobular emphysema (Multi)   [2]  Past Medical History:  Diagnosis Date   • Acute bilateral low back pain with right-sided sciatica 06/15/2018   • COPD with exacerbation (Multi) 02/15/2018   • Deep vein thrombosis (DVT) of other vein of lower extremity, unspecified chronicity, unspecified laterality 04/27/2023   • Esophagitis 02/12/2014   • History of cervical spinal surgery 01/22/2018   • 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   • History of lumbar surgery 01/22/2018   • Left sided sciatica 12/02/2014   • Osteomyelitis 10/18/2013   • Other conditions influencing health status     Stroke syndrome   • Otitis media, unspecified, bilateral 04/12/2022    Acute bilateral otitis media   • Pain in right knee 11/10/2022    Acute pain of right knee   • Personal history of other diseases of the digestive system     History of diverticulitis of colon   • Personal history of other diseases of the digestive system     History of glossitis   • Personal history of other diseases of the musculoskeletal system and connective tissue     History of fibromyositis   • Personal history of other diseases of the musculoskeletal system and connective tissue     History of fibromyositis   • Personal history of other diseases of the nervous system and sense organs 04/15/2022    History of otitis media   • Simple chronic bronchitis (Multi) 02/15/2018   • Sjogren syndrome, unspecified (Multi)     Sjogrens syndrome   • Unspecified otitis externa, unspecified ear 04/15/2022    Otitis  "externa   [3]  Past Surgical History:  Procedure Laterality Date   • CATARACT EXTRACTION  11/15/2012    Cataract Surgery   • CATARACT EXTRACTION  04/08/2013    Cataract Surgery   • COLECTOMY      8\" colon removed   • HYSTERECTOMY  11/15/2012    Hysterectomy   • HYSTERECTOMY  04/08/2013    Hysterectomy   • MR HEAD ANGIO WO IV CONTRAST  06/19/2023    MR HEAD ANGIO WO IV CONTRAST 6/19/2023 GEN MRI   • MR NECK ANGIO WO IV CONTRAST  06/19/2023    MR NECK ANGIO WO IV CONTRAST 6/19/2023 GEN MRI   • OTHER SURGICAL HISTORY  04/08/2013    Fusion / Refusion Of Vertebrae   [4]  Family History  Problem Relation Name Age of Onset   • No Known Problems Mother     • No Known Problems Father     [5]  Current Outpatient Medications on File Prior to Visit   Medication Sig Dispense Refill   • albuterol 2.5 mg /3 mL (0.083 %) nebulizer solution Take 3 mL (2.5 mg) by nebulization every 8 hours if needed for wheezing. 270 mL 2   • amLODIPine (Norvasc) 5 mg tablet Take 1 tablet (5 mg) by mouth once daily. 90 tablet 3   • cloNIDine (Catapres) 0.1 mg tablet Take 1 tablet (0.1 mg) by mouth 2 times a day. 180 tablet 3   • hydroCHLOROthiazide (HYDRODiuril) 25 mg tablet Take 1 tablet (25 mg) by mouth once daily. 90 tablet 3   • latanoprost (Xalatan) 0.005 % ophthalmic solution Latanoprost 0.005 % Ophthalmic Solution   Quantity: 8  Refills: 0        Start : 24-May-2021   Active     • levothyroxine (Synthroid) 50 mcg tablet Take 1 tablet (50 mcg) by mouth once daily in the morning. Take before meals. 90 tablet 3   • lisinopril 10 mg tablet Take 1 tablet (10 mg) by mouth once daily. 90 tablet 3   • meclizine (Antivert) 25 mg tablet Take 1 tablet (25 mg) by mouth 2 times a day as needed for dizziness for up to 90 doses. 90 tablet 0   • metFORMIN (Glucophage) 500 mg tablet TAKE 1 TABLET BY MOUTH TWICE  DAILY 180 tablet 3   • metoprolol succinate XL (Toprol-XL) 50 mg 24 hr tablet Take 1 tablet (50 mg) by mouth once daily. Do not crush or chew. 90 " tablet 3   • omeprazole (PriLOSEC) 40 mg DR capsule TAKE 1 CAPSULE BY MOUTH ONCE  DAILY IN THE MORNING. TAKE  BEFORE MEALS 90 capsule 3   • oxygen (O2) therapy Inhale 3 L/min at 180,000 mL/hr once daily at bedtime. via nasal canula     • peg 400-propylene glycol (SYSTANE) 0.4-0.3 % drops ophthalmic drops ophtho     • simvastatin (Zocor) 20 mg tablet TAKE 1 TABLET BY MOUTH ONCE  DAILY 90 tablet 3   • tiZANidine (Zanaflex) 2 mg tablet Take 1 tablet (2 mg) by mouth every 8 hours if needed for muscle spasms. 90 tablet 0   • [DISCONTINUED] ALPRAZolam (Xanax) 0.25 mg tablet Take 1 tablet (0.25 mg) by mouth 3 times a day as needed for anxiety. 90 tablet 2   • citalopram (CeleXA) 20 mg tablet Take 1 tablet (20 mg) by mouth once daily. 90 tablet 3   • meloxicam (Mobic) 7.5 mg tablet Take 1 tablet (7.5 mg) by mouth once daily. (Patient not taking: Reported on 6/19/2025) 90 tablet 3     No current facility-administered medications on file prior to visit.

## 2025-06-23 ENCOUNTER — TELEPHONE (OUTPATIENT)
Dept: PRIMARY CARE | Facility: CLINIC | Age: 85
End: 2025-06-23
Payer: MEDICARE

## 2025-06-23 NOTE — TELEPHONE ENCOUNTER
Looks like she never dropped off a urine from before, still in the system, can you please ask her to give a urine at the lab.   Thanks so much

## 2025-06-26 DIAGNOSIS — N39.0 ACUTE UTI: Primary | ICD-10-CM

## 2025-06-26 LAB
APPEARANCE UR: CLEAR
BACTERIA #/AREA URNS HPF: ABNORMAL /HPF
BACTERIA UR CULT: ABNORMAL
BACTERIA UR CULT: ABNORMAL
BILIRUB UR QL STRIP: NEGATIVE
COLOR UR: ABNORMAL
GLUCOSE UR QL STRIP: NEGATIVE
HGB UR QL STRIP: NEGATIVE
HYALINE CASTS #/AREA URNS LPF: ABNORMAL /LPF
KETONES UR QL STRIP: ABNORMAL
LEUKOCYTE ESTERASE UR QL STRIP: ABNORMAL
NITRITE UR QL STRIP: NEGATIVE
PH UR STRIP: ABNORMAL [PH] (ref 5–8)
PROT UR QL STRIP: ABNORMAL
RBC #/AREA URNS HPF: ABNORMAL /HPF
SERVICE CMNT-IMP: ABNORMAL
SP GR UR STRIP: 1.02 (ref 1–1.03)
SQUAMOUS #/AREA URNS HPF: ABNORMAL /HPF
WBC #/AREA URNS HPF: ABNORMAL /HPF

## 2025-06-26 RX ORDER — NITROFURANTOIN 25; 75 MG/1; MG/1
100 CAPSULE ORAL 2 TIMES DAILY
Qty: 14 CAPSULE | Refills: 0 | Status: SHIPPED | OUTPATIENT
Start: 2025-06-26 | End: 2025-07-03

## 2025-06-30 ENCOUNTER — TELEPHONE (OUTPATIENT)
Dept: PRIMARY CARE | Facility: CLINIC | Age: 85
End: 2025-06-30
Payer: MEDICARE

## 2025-06-30 NOTE — TELEPHONE ENCOUNTER
Requesting to have a letter for the post office to have mail delivered to their door. Wrote up and ready for you to sign.     Patient will come pick it up.

## 2025-07-08 ENCOUNTER — HOSPITAL ENCOUNTER (OUTPATIENT)
Dept: RADIOLOGY | Facility: HOSPITAL | Age: 85
Discharge: HOME | End: 2025-07-08
Payer: MEDICARE

## 2025-07-08 DIAGNOSIS — Z87.442 PERSONAL HISTORY OF URINARY CALCULI: ICD-10-CM

## 2025-07-08 PROCEDURE — 76770 US EXAM ABDO BACK WALL COMP: CPT

## 2025-07-08 PROCEDURE — 76770 US EXAM ABDO BACK WALL COMP: CPT | Performed by: RADIOLOGY

## 2025-07-30 ENCOUNTER — APPOINTMENT (OUTPATIENT)
Dept: UROLOGY | Facility: CLINIC | Age: 85
End: 2025-07-30
Payer: MEDICARE

## 2025-08-12 ENCOUNTER — TELEPHONE (OUTPATIENT)
Dept: PRIMARY CARE | Facility: CLINIC | Age: 85
End: 2025-08-12
Payer: MEDICARE

## 2025-08-14 ENCOUNTER — OFFICE VISIT (OUTPATIENT)
Dept: PRIMARY CARE | Facility: CLINIC | Age: 85
End: 2025-08-14
Payer: MEDICARE

## 2025-08-14 VITALS
HEART RATE: 45 BPM | DIASTOLIC BLOOD PRESSURE: 62 MMHG | BODY MASS INDEX: 28.63 KG/M2 | TEMPERATURE: 96.5 F | HEIGHT: 62 IN | OXYGEN SATURATION: 96 % | WEIGHT: 155.6 LBS | SYSTOLIC BLOOD PRESSURE: 106 MMHG

## 2025-08-14 DIAGNOSIS — I10 PRIMARY HYPERTENSION: ICD-10-CM

## 2025-08-14 DIAGNOSIS — R00.1 BRADYCARDIA: Primary | ICD-10-CM

## 2025-08-14 DIAGNOSIS — K21.9 GASTROESOPHAGEAL REFLUX DISEASE WITHOUT ESOPHAGITIS: ICD-10-CM

## 2025-08-14 DIAGNOSIS — R39.9 URINARY SYMPTOM OR SIGN: ICD-10-CM

## 2025-08-14 LAB
POC APPEARANCE, URINE: CLEAR
POC BILIRUBIN, URINE: ABNORMAL
POC BLOOD, URINE: NEGATIVE
POC COLOR, URINE: YELLOW
POC GLUCOSE, URINE: NEGATIVE MG/DL
POC KETONES, URINE: NEGATIVE MG/DL
POC LEUKOCYTES, URINE: NEGATIVE
POC NITRITE,URINE: NEGATIVE
POC PH, URINE: 5.5 PH
POC PROTEIN, URINE: NEGATIVE MG/DL
POC SPECIFIC GRAVITY, URINE: >=1.03
POC UROBILINOGEN, URINE: 0.2 EU/DL

## 2025-08-14 PROCEDURE — 1159F MED LIST DOCD IN RCRD: CPT | Performed by: PHYSICIAN ASSISTANT

## 2025-08-14 PROCEDURE — 81003 URINALYSIS AUTO W/O SCOPE: CPT | Performed by: PHYSICIAN ASSISTANT

## 2025-08-14 PROCEDURE — 3074F SYST BP LT 130 MM HG: CPT | Performed by: PHYSICIAN ASSISTANT

## 2025-08-14 PROCEDURE — 99214 OFFICE O/P EST MOD 30 MIN: CPT | Performed by: PHYSICIAN ASSISTANT

## 2025-08-14 PROCEDURE — 3078F DIAST BP <80 MM HG: CPT | Performed by: PHYSICIAN ASSISTANT

## 2025-08-14 RX ORDER — METOPROLOL SUCCINATE 25 MG/1
25 TABLET, EXTENDED RELEASE ORAL DAILY
Qty: 90 TABLET | Refills: 3 | Status: SHIPPED | OUTPATIENT
Start: 2025-08-14

## 2025-08-14 RX ORDER — METOPROLOL SUCCINATE 25 MG/1
25 TABLET, EXTENDED RELEASE ORAL DAILY
Qty: 30 TABLET | Refills: 0 | Status: SHIPPED | OUTPATIENT
Start: 2025-08-14 | End: 2025-08-14

## 2025-08-21 LAB — TSH SERPL-ACNC: 1.93 MIU/L (ref 0.4–4.5)

## 2025-08-22 ENCOUNTER — TELEPHONE (OUTPATIENT)
Dept: PRIMARY CARE | Facility: CLINIC | Age: 85
End: 2025-08-22
Payer: MEDICARE

## 2025-08-27 ENCOUNTER — APPOINTMENT (OUTPATIENT)
Dept: PRIMARY CARE | Facility: CLINIC | Age: 85
End: 2025-08-27
Payer: MEDICARE

## 2025-08-27 DIAGNOSIS — I10 PRIMARY HYPERTENSION: ICD-10-CM

## 2025-08-27 RX ORDER — METOPROLOL SUCCINATE 25 MG/1
25 TABLET, EXTENDED RELEASE ORAL DAILY
Qty: 90 TABLET | Refills: 3 | OUTPATIENT
Start: 2025-08-27

## 2025-09-01 DIAGNOSIS — E78.5 DYSLIPIDEMIA: ICD-10-CM

## 2025-09-02 RX ORDER — SIMVASTATIN 20 MG/1
20 TABLET, FILM COATED ORAL DAILY
Qty: 90 TABLET | Refills: 3 | Status: SHIPPED | OUTPATIENT
Start: 2025-09-02

## 2025-09-03 DIAGNOSIS — F41.9 ANXIETY: ICD-10-CM

## 2025-09-03 RX ORDER — ALPRAZOLAM 0.25 MG/1
0.25 TABLET ORAL 3 TIMES DAILY PRN
Qty: 90 TABLET | Refills: 2 | Status: SHIPPED | OUTPATIENT
Start: 2025-09-03

## 2025-09-04 ENCOUNTER — APPOINTMENT (OUTPATIENT)
Dept: PRIMARY CARE | Facility: CLINIC | Age: 85
End: 2025-09-04
Payer: MEDICARE

## 2025-09-23 ENCOUNTER — APPOINTMENT (OUTPATIENT)
Dept: PRIMARY CARE | Facility: CLINIC | Age: 85
End: 2025-09-23
Payer: MEDICARE